# Patient Record
Sex: FEMALE | Race: WHITE | NOT HISPANIC OR LATINO | Employment: OTHER | URBAN - METROPOLITAN AREA
[De-identification: names, ages, dates, MRNs, and addresses within clinical notes are randomized per-mention and may not be internally consistent; named-entity substitution may affect disease eponyms.]

---

## 2017-01-13 ENCOUNTER — CLINICAL SUPPORT (OUTPATIENT)
Dept: FAMILY MEDICINE | Facility: CLINIC | Age: 77
End: 2017-01-13
Payer: MEDICARE

## 2017-01-13 ENCOUNTER — TELEPHONE (OUTPATIENT)
Dept: FAMILY MEDICINE | Facility: CLINIC | Age: 77
End: 2017-01-13

## 2017-01-13 DIAGNOSIS — N39.0 URINARY TRACT INFECTION WITH HEMATURIA, SITE UNSPECIFIED: Primary | ICD-10-CM

## 2017-01-13 DIAGNOSIS — R31.9 URINARY TRACT INFECTION WITH HEMATURIA, SITE UNSPECIFIED: Primary | ICD-10-CM

## 2017-01-13 LAB
BILIRUB SERPL-MCNC: ABNORMAL MG/DL
BLOOD URINE, POC: ABNORMAL
COLOR, POC UA: ABNORMAL
GLUCOSE UR QL STRIP: ABNORMAL
KETONES UR QL STRIP: ABNORMAL
LEUKOCYTE ESTERASE URINE, POC: ABNORMAL
NITRITE, POC UA: ABNORMAL
PH, POC UA: 6
PROTEIN, POC: ABNORMAL
SPECIFIC GRAVITY, POC UA: 1.01
UROBILINOGEN, POC UA: ABNORMAL

## 2017-01-13 PROCEDURE — 81002 URINALYSIS NONAUTO W/O SCOPE: CPT | Mod: S$GLB,,, | Performed by: FAMILY MEDICINE

## 2017-01-13 RX ORDER — CIPROFLOXACIN 250 MG/1
250 TABLET, FILM COATED ORAL 2 TIMES DAILY
Qty: 10 TABLET | Refills: 0 | Status: SHIPPED | OUTPATIENT
Start: 2017-01-13 | End: 2017-02-13 | Stop reason: ALTCHOICE

## 2017-01-26 ENCOUNTER — CLINICAL SUPPORT (OUTPATIENT)
Dept: FAMILY MEDICINE | Facility: CLINIC | Age: 77
End: 2017-01-26
Payer: MEDICARE

## 2017-01-26 ENCOUNTER — TELEPHONE (OUTPATIENT)
Dept: FAMILY MEDICINE | Facility: CLINIC | Age: 77
End: 2017-01-26

## 2017-01-26 DIAGNOSIS — R31.9 URINARY TRACT INFECTION WITH HEMATURIA, SITE UNSPECIFIED: Primary | ICD-10-CM

## 2017-01-26 DIAGNOSIS — N39.0 URINARY TRACT INFECTION WITHOUT HEMATURIA, SITE UNSPECIFIED: Primary | ICD-10-CM

## 2017-01-26 DIAGNOSIS — N39.0 URINARY TRACT INFECTION WITH HEMATURIA, SITE UNSPECIFIED: Primary | ICD-10-CM

## 2017-01-26 DIAGNOSIS — N39.0 URINARY TRACT INFECTION WITHOUT HEMATURIA, SITE UNSPECIFIED: ICD-10-CM

## 2017-01-26 LAB
BILIRUB SERPL-MCNC: ABNORMAL MG/DL
BLOOD URINE, POC: ABNORMAL
COLOR, POC UA: YELLOW
GLUCOSE UR QL STRIP: ABNORMAL
KETONES UR QL STRIP: ABNORMAL
LEUKOCYTE ESTERASE URINE, POC: ABNORMAL
NITRITE, POC UA: POSITIVE
PH, POC UA: 5
PROTEIN, POC: ABNORMAL
SPECIFIC GRAVITY, POC UA: 1.01
UROBILINOGEN, POC UA: 1

## 2017-01-26 PROCEDURE — 81002 URINALYSIS NONAUTO W/O SCOPE: CPT | Mod: S$GLB,,, | Performed by: FAMILY MEDICINE

## 2017-01-26 PROCEDURE — 87077 CULTURE AEROBIC IDENTIFY: CPT

## 2017-01-26 PROCEDURE — 87086 URINE CULTURE/COLONY COUNT: CPT

## 2017-01-26 PROCEDURE — 87186 SC STD MICRODIL/AGAR DIL: CPT

## 2017-01-26 PROCEDURE — 87088 URINE BACTERIA CULTURE: CPT

## 2017-01-26 RX ORDER — CEPHALEXIN 500 MG/1
1000 CAPSULE ORAL EVERY 12 HOURS
Qty: 28 CAPSULE | Refills: 0 | Status: SHIPPED | OUTPATIENT
Start: 2017-01-26 | End: 2017-02-13 | Stop reason: ALTCHOICE

## 2017-01-26 NOTE — TELEPHONE ENCOUNTER
Pt daughter dropped off urine sample  Urine is cloudy with large leukocytes, positive nitrates, trace bilirubin and trace blood  Please advise  See message below  Treat? Culture?

## 2017-01-26 NOTE — TELEPHONE ENCOUNTER
----- Message from Laine King sent at 1/26/2017  9:11 AM CST -----  Contact: Pt [phone]  Patient states she had a bladder infection recently but believes it may have started again. Patient states she started taking AZO her last dose was yesterday. Patient states urine color is back to normal now and would like to bring in a urine sample to check. Patient is experiencing frequent urination. Patient states she has no known allergies and [facility]

## 2017-01-28 LAB — BACTERIA UR CULT: NORMAL

## 2017-01-30 ENCOUNTER — TELEPHONE (OUTPATIENT)
Dept: FAMILY MEDICINE | Facility: CLINIC | Age: 77
End: 2017-01-30

## 2017-02-06 DIAGNOSIS — E78.00 HYPERCHOLESTEROLEMIA: ICD-10-CM

## 2017-02-06 RX ORDER — PRAVASTATIN SODIUM 80 MG/1
TABLET ORAL
Qty: 90 TABLET | Refills: 1 | Status: SHIPPED | OUTPATIENT
Start: 2017-02-06 | End: 2017-08-07 | Stop reason: SDUPTHER

## 2017-02-09 RX ORDER — ETODOLAC 400 MG/1
TABLET, FILM COATED ORAL
Qty: 180 TABLET | Refills: 0 | Status: SHIPPED | OUTPATIENT
Start: 2017-02-09 | End: 2017-03-31

## 2017-02-13 ENCOUNTER — OFFICE VISIT (OUTPATIENT)
Dept: FAMILY MEDICINE | Facility: CLINIC | Age: 77
End: 2017-02-13
Payer: MEDICARE

## 2017-02-13 VITALS
TEMPERATURE: 98 F | SYSTOLIC BLOOD PRESSURE: 144 MMHG | BODY MASS INDEX: 33.73 KG/M2 | HEIGHT: 66 IN | HEART RATE: 67 BPM | WEIGHT: 209.88 LBS | OXYGEN SATURATION: 94 % | DIASTOLIC BLOOD PRESSURE: 72 MMHG

## 2017-02-13 DIAGNOSIS — R60.0 EDEMA OF LEFT LOWER EXTREMITY: Primary | ICD-10-CM

## 2017-02-13 PROCEDURE — 99213 OFFICE O/P EST LOW 20 MIN: CPT | Mod: S$GLB,,, | Performed by: NURSE PRACTITIONER

## 2017-02-13 RX ORDER — PANTOPRAZOLE SODIUM 40 MG/1
1 TABLET, DELAYED RELEASE ORAL DAILY
COMMUNITY
Start: 2017-02-08 | End: 2017-03-31

## 2017-02-13 RX ORDER — ALBUTEROL SULFATE 90 UG/1
AEROSOL, METERED RESPIRATORY (INHALATION)
COMMUNITY
Start: 2017-02-01 | End: 2017-03-31

## 2017-02-13 NOTE — PROGRESS NOTES
Subjective:       Patient ID: Nallely Doty is a 77 y.o. female.    Chief Complaint: Rash (left leg) and Leg Swelling (left leg swelling)    Edema   This is a new problem. The current episode started in the past 7 days (left lower leg). The problem occurs constantly. The problem has been unchanged. Associated symptoms include a rash. Pertinent negatives include no abdominal pain, anorexia, arthralgias, change in bowel habit, chest pain, chills, congestion, coughing, diaphoresis, fatigue, fever, headaches, joint swelling, myalgias, nausea, neck pain, numbness, sore throat, swollen glands, urinary symptoms, vertigo, visual change, vomiting or weakness. Nothing aggravates the symptoms. Treatments tried: she is on bumex and hctz. The treatment provided no relief.     Review of Systems   Constitutional: Negative for chills, diaphoresis, fatigue and fever.   HENT: Negative for congestion and sore throat.    Respiratory: Negative for cough, chest tightness, shortness of breath and wheezing.    Cardiovascular: Positive for leg swelling. Negative for chest pain.   Gastrointestinal: Negative for abdominal pain, anorexia, change in bowel habit, nausea and vomiting.   Musculoskeletal: Negative for arthralgias, joint swelling, myalgias and neck pain.        Left lower leg edema     Skin: Positive for rash.        Has been to derm for her rash-says the MD diagnosed her with dry skin-rash is healing    Neurological: Negative for dizziness, vertigo, weakness, numbness and headaches.       Objective:      Physical Exam   Constitutional: She is oriented to person, place, and time. She appears well-developed and well-nourished. No distress.   HENT:   Right Ear: External ear normal.   Left Ear: External ear normal.   Cardiovascular: Normal rate, regular rhythm and normal heart sounds.    Pulses:       Dorsalis pedis pulses are 2+ on the left side.   Pulmonary/Chest: Effort normal and breath sounds normal.   Musculoskeletal: She  exhibits edema. She exhibits no tenderness or deformity.        Left lower leg: She exhibits swelling.        Left foot: There is swelling.   No pain with flexion or doriflexion, no warmth or redness to lower leg    Feet:   Left Foot:   Skin Integrity: Positive for ulcer (has an ulcer under left great toe being treated by podiatary).   Neurological: She is alert and oriented to person, place, and time.   Skin: Skin is warm and dry. She is not diaphoretic.   Psychiatric: She has a normal mood and affect. Her behavior is normal. Judgment and thought content normal.   Vitals reviewed.      Assessment:       1. Edema of left lower extremity        Plan:       Edema of left lower extremity    Continue dieretic  Elevate legs  Compression stocking  If leg becomes hot red or swollen go to the ED

## 2017-02-13 NOTE — MR AVS SNAPSHOT
[facility]  [address] [place] [address]  Phone: [phone]  Fax: [phone]                  Nallely Doty   2017 9:20 AM   Office Visit    Description:  Female : 1940   Provider:  Johanna Eden NP   Department:  [facility]           Reason for Visit     Rash     Leg Swelling           Diagnoses this Visit        Comments    Edema of left lower extremity    -  Primary            To Do List           Goals (5 Years of Data)     None      [facility]     [facility] On [facility] Care [facility] -  Assistance  Registered nurses in the [facility] On Call Center provide clinical advisement, health education, appointment booking, and other advisory services.  Call for this free service at [phone].             Medications           Message regarding Medications     Verify the changes and/or additions to your medication regime listed below are the same as discussed with your clinician today.  If any of these changes or additions are incorrect, please notify your healthcare provider.        STOP taking these medications     cephALEXin (KEFLEX) 500 MG capsule Take 2 capsules (1,000 mg total) by mouth every 12 (twelve) hours.    ciprofloxacin HCl (CIPRO) 250 MG tablet Take 1 tablet (250 mg total) by mouth 2 (two) times daily.    sulfamethoxazole-trimethoprim 800-160mg (BACTRIM DS) 800-160 mg Tab Take 1 tablet by mouth 2 (two) times daily.           Verify that the below list of medications is an accurate representation of the medications you are currently taking.  If none reported, the list may be blank. If incorrect, please contact your healthcare provider. Carry this list with you in case of emergency.           Current Medications     albuterol-ipratropium 2.5mg-0.5mg/3mL (DUO-NEB) 0.5 mg-3 mg(2.5 mg base)/3 mL nebulizer solution Take 3 mLs by nebulization every 6 (six) hours as needed for Wheezing.    aspirin (ECOTRIN) 81 MG EC tablet Take 81 mg by  "mouth once daily.    bromfenac (XIBROM) 0.09 % ophthalmic solution     bumetanide (BUMEX) 1 MG tablet TAKE 1 TABLET DAILY    carbamazepine (TEGRETOL) 200 mg tablet TAKE 1 TABLET TWICE A DAY    carvedilol (COREG) 25 MG tablet     cinacalcet (SENSIPAR) 60 MG Tab Take 60 mg by mouth daily with breakfast.    etodolac (LODINE) 400 MG tablet TAKE 1 TABLET TWICE A DAY    ferrous sulfate 325 mg (65 mg iron) Tab tablet Take 325 mg by mouth 2 (two) times daily.    gemfibrozil (LOPID) 600 MG tablet TAKE 1 TABLET TWICE A DAY    gentamicin (GARAMYCIN) 0.3 % ophthalmic solution 1 drop affected eye times a day for 5 days    hydrochlorothiazide (HYDRODIURIL) 50 MG tablet Take 1 tablet (50 mg total) by mouth 3 (three) times a week. Monday, wed, fri morning    levothyroxine (SYNTHROID) 100 MCG tablet TAKE 1 TABLET BEFORE BREAKFAST    pantoprazole (PROTONIX) 40 MG tablet Take 1 tablet by mouth once daily.    pravastatin (PRAVACHOL) 80 MG tablet TAKE 1 TABLET DAILY    SYMBICORT 80-4.5 mcg/actuation HFAA     torsemide (DEMADEX) 100 MG Tab Take 1 tablet (100 mg total) by mouth once daily.    triamcinolone acetonide 0.1% (KENALOG) 0.1 % cream     walker (LILI ROLLING WALKER) Misc Use for ambulation    VENTOLIN HFA 90 mcg/actuation inhaler            Clinical Reference Information           Your Vitals Were     BP Pulse Temp Height Weight SpO2    144/72 67 98.3 °F (36.8 °C) (Oral) 5' 6" (1.676 m) 95.2 kg (209 lb 14.1 oz) 94%    BMI                33.88 kg/m2          Blood Pressure          Most Recent Value    BP  (!)  144/72      Allergies as of 2/13/2017     Iodine And Iodide Containing Products    Shellfish Containing Products      Immunizations Administered on Date of Encounter - 2/13/2017     None      [facility] Sign-Up     Activating your [facility] account is as easy as 1-2-3!     1) Visit [email or web address], select Sign Up Now, enter this activation code and your date of birth, then select Next.  [email or web address]  Expires: 3/30/2017 " 10:45 AM      2) Create a username and password to use when you visit [facility] in the future and select a security question in case you lose your password and select Next.    3) Enter your e-mail address and click Sign Up!    Additional Information  If you have questions, please e-mail [email or web address] or call [phone] to talk to our [employer] staff. Remember, [facility] is NOT to be used for urgent needs. For medical emergencies, dial 911.         Language Assistance Services     ATTENTION: Language assistance services are available, free of charge. Please call [phone].      ATENCIÓN: Si trevorla maría, tiene a berumen disposición servicios gratuitos de asistencia lingüística. Llame al [phone].     CHÚ Ý: N?u b?n nói Ti?ng Vi?t, có các d?ch v? h? tr? ngôn ng? mi?n phí dành cho b?n. G?i s? [phone].         [facility] complies with applicable Federal civil rights laws and does not discriminate on the basis of race, color, national origin, age, disability, or sex.

## 2017-02-15 ENCOUNTER — OFFICE VISIT (OUTPATIENT)
Dept: FAMILY MEDICINE | Facility: CLINIC | Age: 77
End: 2017-02-15
Payer: MEDICARE

## 2017-02-15 VITALS
WEIGHT: 210.56 LBS | HEIGHT: 66 IN | HEART RATE: 71 BPM | SYSTOLIC BLOOD PRESSURE: 136 MMHG | TEMPERATURE: 98 F | BODY MASS INDEX: 33.84 KG/M2 | DIASTOLIC BLOOD PRESSURE: 80 MMHG | OXYGEN SATURATION: 93 %

## 2017-02-15 DIAGNOSIS — L03.116 CELLULITIS OF LEFT LOWER EXTREMITY: Primary | ICD-10-CM

## 2017-02-15 PROCEDURE — 99213 OFFICE O/P EST LOW 20 MIN: CPT | Mod: S$GLB,,, | Performed by: NURSE PRACTITIONER

## 2017-02-15 RX ORDER — SULFAMETHOXAZOLE AND TRIMETHOPRIM 800; 160 MG/1; MG/1
1 TABLET ORAL 2 TIMES DAILY
Qty: 14 TABLET | Refills: 0 | Status: SHIPPED | OUTPATIENT
Start: 2017-02-15 | End: 2017-02-25

## 2017-02-15 NOTE — PATIENT INSTRUCTIONS
Cellulitis  Cellulitis is an infection of the deep layers of skin. A break in the skin, such as a cut or scratch, can let bacteria under the skin. If the bacteria get to deep layers of the skin, it can be serious. If not treated, cellulitis can get into the bloodstream and lymph nodes. The infection can then spread throughout the body. This causes serious illness.  Cellulitis causes the affected skin to become red, swollen, warm, and sore. The reddened areas have a visible border. An open sore may leak fluid (pus). You may have a fever, chills, and pain.  Cellulitis is treated with antibiotics taken for 7 to 10 days. An open sore may be cleaned and covered with cool wet gauze. Symptoms should get better 1 to 2 days after treatment is started. Make sure to take all the antibiotics for the full number of days until they are gone. Keep taking the medicine even if your symptoms go away.  Home care  Follow these tips:  · Limit the use of the part of your body with cellulitis.   · If the infection is on your leg, keep your leg raised while sitting. This will help to reduce swelling.  · Take all of the antibiotic medicine exactly as directed until it is gone. Do not miss any doses, especially during the first 7 days. Dont stop taking the medicine when your symptoms get better.  · Keep the affected area clean and dry.  · Wash your hands with soap and warm water before and after touching your skin. Anyone else who touches your skin should also wash his or her hands. Don't share towels.  Follow-up care  Follow up with your healthcare provider, or as advised. If your infection does not go away on the first antibiotic, your healthcare provider will prescribe a different one.  When to seek medical advice  Call your healthcare provider right away if any of these occur:  · Red areas that spread  · Swelling or pain that gets worse  · Fluid leaking from the skin (pus)  · Fever higher of 100.4º F (38.0º C) or higher after 2 days  on antibiotics  Date Last Reviewed: 9/1/2016  © 8127-0235 [employer]. [address] [place] [address], [place] [address]. All rights reserved. This information is not intended as a substitute for professional medical care. Always follow your healthcare professional's instructions.

## 2017-02-15 NOTE — MR AVS SNAPSHOT
[place] [address] [address]  Phone: [phone]  Fax: [phone]                  Nallely Doty   2/15/2017 11:40 AM   Office Visit    Description:  Female : 1940   Provider:  Johanna Eden NP   Department:  [facility]           Reason for Visit     Edema           Diagnoses this Visit        Comments    Cellulitis of left lower extremity    -  Primary            To Do List           Goals (5 Years of Data)     None       These Medications        Disp Refills Start End    sulfamethoxazole-trimethoprim 800-160mg (BACTRIM DS) 800-160 mg Tab 14 tablet 0 2/15/2017 2017    Take 1 tablet by mouth 2 (two) times daily. - Oral    Pharmacy: [facility] #: [phone]         [facility] On Call     [facility] On Call Nurse Care Line -  Assistance  Registered nurses in the [facility] On Call Center provide clinical advisement, health education, appointment booking, and other advisory services.  Call for this free service at [phone].             Medications           Message regarding Medications     Verify the changes and/or additions to your medication regime listed below are the same as discussed with your clinician today.  If any of these changes or additions are incorrect, please notify your healthcare provider.        START taking these NEW medications        Refills    sulfamethoxazole-trimethoprim 800-160mg (BACTRIM DS) 800-160 mg Tab 0    Sig: Take 1 tablet by mouth 2 (two) times daily.    Class: Normal    Route: Oral           Verify that the below list of medications is an accurate representation of the medications you are currently taking.  If none reported, the list may be blank. If incorrect, please contact your healthcare provider. Carry this list with you in case of emergency.           Current Medications     albuterol-ipratropium 2.5mg-0.5mg/3mL (DUO-NEB) 0.5 mg-3  "mg(2.5 mg base)/3 mL nebulizer solution Take 3 mLs by nebulization every 6 (six) hours as needed for Wheezing.    aspirin (ECOTRIN) 81 MG EC tablet Take 81 mg by mouth once daily.    bromfenac (XIBROM) 0.09 % ophthalmic solution     bumetanide (BUMEX) 1 MG tablet TAKE 1 TABLET DAILY    carbamazepine (TEGRETOL) 200 mg tablet TAKE 1 TABLET TWICE A DAY    carvedilol (COREG) 25 MG tablet     cinacalcet (SENSIPAR) 60 MG Tab Take 60 mg by mouth daily with breakfast.    etodolac (LODINE) 400 MG tablet TAKE 1 TABLET TWICE A DAY    ferrous sulfate 325 mg (65 mg iron) Tab tablet Take 325 mg by mouth 2 (two) times daily.    gemfibrozil (LOPID) 600 MG tablet TAKE 1 TABLET TWICE A DAY    gentamicin (GARAMYCIN) 0.3 % ophthalmic solution 1 drop affected eye times a day for 5 days    hydrochlorothiazide (HYDRODIURIL) 50 MG tablet Take 1 tablet (50 mg total) by mouth 3 (three) times a week. Monday, wed, fri morning    levothyroxine (SYNTHROID) 100 MCG tablet TAKE 1 TABLET BEFORE BREAKFAST    pantoprazole (PROTONIX) 40 MG tablet Take 1 tablet by mouth once daily.    pravastatin (PRAVACHOL) 80 MG tablet TAKE 1 TABLET DAILY    SYMBICORT 80-4.5 mcg/actuation HFAA     torsemide (DEMADEX) 100 MG Tab Take 1 tablet (100 mg total) by mouth once daily.    triamcinolone acetonide 0.1% (KENALOG) 0.1 % cream     VENTOLIN HFA 90 mcg/actuation inhaler     walker (LILI ROLLING WALKER) Misc Use for ambulation    sulfamethoxazole-trimethoprim 800-160mg (BACTRIM DS) 800-160 mg Tab Take 1 tablet by mouth 2 (two) times daily.           Clinical Reference Information           Your Vitals Were     BP Pulse Temp Height Weight SpO2    136/80 71 97.5 °F (36.4 °C) (Oral) 5' 6" (1.676 m) 95.5 kg (210 lb 8.6 oz) 93%    BMI                33.98 kg/m2          Blood Pressure          Most Recent Value    BP  136/80      Allergies as of 2/15/2017     Iodine And Iodide Containing Products    Shellfish Containing Products      Immunizations Administered on Date of " Encounter - 2/15/2017     None      [facility] Sign-Up     Activating your [facility] account is as easy as 1-2-3!     1) Visit [email or web address], select Sign Up Now, enter this activation code and your date of birth, then select Next.  [email or web address]  Expires: 3/30/2017 10:45 AM      2) Create a username and password to use when you visit [facility] in the future and select a security question in case you lose your password and select Next.    3) Enter your e-mail address and click Sign Up!    Additional Information  If you have questions, please e-mail [email or web address] or call [phone] to talk to our [facility] staff. Remember, [facility] is NOT to be used for urgent needs. For medical emergencies, dial 911.         Instructions      Cellulitis  Cellulitis is an infection of the deep layers of skin. A break in the skin, such as a cut or scratch, can let bacteria under the skin. If the bacteria get to deep layers of the skin, it can be serious. If not treated, cellulitis can get into the bloodstream and lymph nodes. The infection can then spread throughout the body. This causes serious illness.  Cellulitis causes the affected skin to become red, swollen, warm, and sore. The reddened areas have a visible border. An open sore may leak fluid (pus). You may have a fever, chills, and pain.  Cellulitis is treated with antibiotics taken for 7 to 10 days. An open sore may be cleaned and covered with cool wet gauze. Symptoms should get better 1 to 2 days after treatment is started. Make sure to take all the antibiotics for the full number of days until they are gone. Keep taking the medicine even if your symptoms go away.  Home care  Follow these tips:  · Limit the use of the part of your body with cellulitis.   · If the infection is on your leg, keep your leg raised while sitting. This will help to reduce swelling.  · Take all of the antibiotic medicine exactly as directed until it is gone. Do not miss any doses,  especially during the first 7 days. Dont stop taking the medicine when your symptoms get better.  · Keep the affected area clean and dry.  · Wash your hands with soap and warm water before and after touching your skin. Anyone else who touches your skin should also wash his or her hands. Don't share towels.  Follow-up care  Follow up with your healthcare provider, or as advised. If your infection does not go away on the first antibiotic, your healthcare provider will prescribe a different one.  When to seek medical advice  Call your healthcare provider right away if any of these occur:  · Red areas that spread  · Swelling or pain that gets worse  · Fluid leaking from the skin (pus)  · Fever higher of 100.4º F (38.0º C) or higher after 2 days on antibiotics  Date Last Reviewed: 9/1/2016  © 2785-6259 [employer]. [address] [place] [address]. All rights reserved. This information is not intended as a substitute for professional medical care. Always follow your healthcare professional's instructions.             Language Assistance Services     ATTENTION: Language assistance services are available, free of charge. Please call [phone].      ATENCIÓN: Si abimbola daniel, tiene a berumen disposición servicios gratuitos de asistencia lingüística. Llame al [phone].     DIYA Ý: N?u b?n nói Ti?ng Vi?t, có các d?ch v? h? tr? ngôn ng? mi?n phí dành cho b?n. G?i s? [phone].         [facility] complies with applicable Federal civil rights laws and does not discriminate on the basis of race, color, national origin, age, disability, or sex.

## 2017-02-17 DIAGNOSIS — E03.2 HYPOTHYROIDISM DUE TO NON-MEDICATION EXOGENOUS SUBSTANCES: ICD-10-CM

## 2017-02-17 RX ORDER — LEVOTHYROXINE SODIUM 100 UG/1
TABLET ORAL
Qty: 90 TABLET | Refills: 1 | Status: SHIPPED | OUTPATIENT
Start: 2017-02-17 | End: 2017-08-16 | Stop reason: SDUPTHER

## 2017-03-31 ENCOUNTER — OFFICE VISIT (OUTPATIENT)
Dept: FAMILY MEDICINE | Facility: CLINIC | Age: 77
End: 2017-03-31
Payer: MEDICARE

## 2017-03-31 VITALS
TEMPERATURE: 98 F | DIASTOLIC BLOOD PRESSURE: 76 MMHG | OXYGEN SATURATION: 97 % | SYSTOLIC BLOOD PRESSURE: 128 MMHG | HEIGHT: 66 IN | WEIGHT: 204.13 LBS | HEART RATE: 60 BPM | BODY MASS INDEX: 32.81 KG/M2

## 2017-03-31 DIAGNOSIS — N18.9 CHRONIC KIDNEY DISEASE, UNSPECIFIED STAGE: ICD-10-CM

## 2017-03-31 DIAGNOSIS — I95.9 HYPOTENSION, UNSPECIFIED HYPOTENSION TYPE: Primary | ICD-10-CM

## 2017-03-31 DIAGNOSIS — J98.4 PULMONARY DISEASE: ICD-10-CM

## 2017-03-31 PROCEDURE — 99213 OFFICE O/P EST LOW 20 MIN: CPT | Mod: S$GLB,,, | Performed by: FAMILY MEDICINE

## 2017-03-31 RX ORDER — FAMOTIDINE 20 MG/1
20 TABLET, FILM COATED ORAL 2 TIMES DAILY PRN
COMMUNITY
Start: 2017-02-16 | End: 2017-03-31

## 2017-03-31 NOTE — PATIENT INSTRUCTIONS
Reduce Torsemide to 50 mg daily if it is not enough fluid control increase to a whole pill MWF    IF low blood pressure continues to be low or you are dizzy  Coreg 25 mg cut in half and take half two times a day

## 2017-03-31 NOTE — PROGRESS NOTES
Patient ID: Nallely Doty is a 77 y.o. female.    Chief Complaint: Follow-up (check-up); Dizziness (lightheaded); and Shortness of Breath    HPI       Nallely Doty is a 77 y.o. female with weakness in the am for 3 hours after taking meds.  Never held meds.   No fever chills nausea vomiting diarrhea  Appetite is the same  descrepacy about use of diuretics-recently taken off HCTZ     Blood pressure readings over the last several weeks have been decreasing now in the mid to lower teens systolic    Review of Symptoms    Constitutional  Neg activity change, No chills/ fever   Resp  Neg hemoptysis, stridor, choking  CVS  Neg chest pain, palpitations    Physical Exam    Constitutional:   Oriented to person, place, and time.appears well-developed and well-nourished.   No distress.     HENT:   Head: Normocephalic and atraumatic.       Eyes:   Conjunctivae are normal. Right eye exhibits no discharge. Left eye exhibits no discharge. No scleral icterus. No periorbital edema       Cardiovascular:  Regular rate, Regular rhythm     No extrasystole  Normal S1-S2    Pulmonary/Chest:   Normal effort and breath sounds.  No wheezing, rhonchi or rales.      Musculoskeletal:  1+ edema lower extremity   No obvious deformity No waisting     Neurological:   Alert and oriented to person, place, and time. Coordination normal. -uses walker for ambulation    Skin:   Skin is warm and dry.   No diaphoresis.   No rash noted.    Psychiatric: Normal mood and affect. Behavior is normal. Judgment and thought content normal.       Assessment / Plan:      ICD-10-CM ICD-9-CM   1. Hypotension, unspecified hypotension type I95.9 458.9   2. Pulmonary disease J98.4 518.89   3. Chronic kidney disease, unspecified stage N18.9 585.9     Hypotension, unspecified hypotension type  Comments:  decrease diuretics    Pulmonary disease  Comments:  Stable at this time    Chronic kidney disease, unspecified stage  Comments:  Keep diuretics at a  minimal    Continue follow-up with nephrologist

## 2017-03-31 NOTE — MR AVS SNAPSHOT
[facility]  [address] [place] [address]  Phone: [phone]  Fax: [phone]                  Nallely Doty   3/31/2017 10:40 AM   Office Visit    Description:  Female : 1940   Provider:  Gabriel Vidse MD   Department:  [facility]           Reason for Visit     Follow-up     Dizziness     Shortness of Breath                To Do List           Goals (5 Years of Data)     None      [facility]     [facility] On [facility] Nurse Care Line -  Assistance  Unless otherwise directed by your provider, please contact [facility], our nurse care line that is available for  assistance.     Registered nurses in the [facility] provide: appointment scheduling, clinical advisement, health education, and other advisory services.  Call: [phone] (toll free)               Medications           Message regarding Medications     Verify the changes and/or additions to your medication regime listed below are the same as discussed with your clinician today.  If any of these changes or additions are incorrect, please notify your healthcare provider.        STOP taking these medications     hydrochlorothiazide (HYDRODIURIL) 50 MG tablet Take 1 tablet (50 mg total) by mouth 3 (three) times a week. Monday, wed, fri morning    etodolac (LODINE) 400 MG tablet TAKE 1 TABLET TWICE A DAY    famotidine (PEPCID) 20 MG tablet Take 20 mg by mouth 2 (two) times daily as needed.     gentamicin (GARAMYCIN) 0.3 % ophthalmic solution 1 drop affected eye times a day for 5 days    pantoprazole (PROTONIX) 40 MG tablet Take 1 tablet by mouth once daily.    VENTOLIN HFA 90 mcg/actuation inhaler     bumetanide (BUMEX) 1 MG tablet TAKE 1 TABLET DAILY           Verify that the below list of medications is an accurate representation of the medications you are currently taking.  If none reported, the list may be blank. If incorrect, please contact your healthcare  "provider. Carry this list with you in case of emergency.           Current Medications     albuterol-ipratropium 2.5mg-0.5mg/3mL (DUO-NEB) 0.5 mg-3 mg(2.5 mg base)/3 mL nebulizer solution Take 3 mLs by nebulization every 6 (six) hours as needed for Wheezing.    aspirin (ECOTRIN) 81 MG EC tablet Take 81 mg by mouth once daily.    bromfenac (XIBROM) 0.09 % ophthalmic solution Place 1 drop into both eyes 2 (two) times daily.     carbamazepine (TEGRETOL) 200 mg tablet TAKE 1 TABLET TWICE A DAY    carvedilol (COREG) 25 MG tablet Take 25 mg by mouth 2 (two) times daily with meals.     cinacalcet (SENSIPAR) 60 MG Tab Take 60 mg by mouth daily with breakfast.    ferrous sulfate 325 mg (65 mg iron) Tab tablet Take 325 mg by mouth 2 (two) times daily.    gemfibrozil (LOPID) 600 MG tablet TAKE 1 TABLET TWICE A DAY    levothyroxine (SYNTHROID) 100 MCG tablet TAKE 1 TABLET BEFORE BREAKFAST    pravastatin (PRAVACHOL) 80 MG tablet TAKE 1 TABLET DAILY    SYMBICORT 80-4.5 mcg/actuation HFAA Inhale 2 puffs into the lungs once daily.     torsemide (DEMADEX) 100 MG Tab Take 1 tablet (100 mg total) by mouth once daily.    triamcinolone acetonide 0.1% (KENALOG) 0.1 % cream     walker (LILI ROLLING WALKER) Misc Use for ambulation           Clinical Reference Information           Your Vitals Were     BP Pulse Temp Height Weight SpO2    128/76 60 97.8 °F (36.6 °C) (Oral) 5' 6" (1.676 m) 92.6 kg (204 lb 2.3 oz) 97%    BMI                32.95 kg/m2          Blood Pressure          Most Recent Value    BP  128/76      Allergies as of 3/31/2017     Iodine And Iodide Containing Products    Shellfish Containing Products      Immunizations Administered on Date of Encounter - 3/31/2017     None      [facility] Sign-Up     Activating your [facility] account is as easy as 1-2-3!     1) Visit [email or web address], select Sign Up Now, enter this activation code and your date of birth, then select Next.  [email or web address]  Expires: 5/15/2017 11:58 AM      2) " Create a username and password to use when you visit [facility] in the future and select a security question in case you lose your password and select Next.    3) Enter your e-mail address and click Sign Up!    Additional Information  If you have questions, please e-mail [email or web address] or call [phone] to talk to our [facility] staff. Remember, [facility] is NOT to be used for urgent needs. For medical emergencies, dial 911.         Instructions    Reduce Torsemide to 50 mg daily if it is not enough fluid control increase to a whole pill MWF    IF low blood pressure continues to be low or you are dizzy  Coreg 25 mg cut in half and take half two times a day       Language Assistance Services     ATTENTION: Language assistance services are available, free of charge. Please call [phone].      ATENCIÓN: Si abimbola daniel, tiene a berumen disposición servicios gratuitos de asistencia lingüística. Llame al [phone].     CHÚ Ý: N?u b?n nói Ti?ng Vi?t, có các d?ch v? h? tr? ngôn ng? mi?n phí dành cho b?n. G?i s? [phone].         [facility] complies with applicable Federal civil rights laws and does not discriminate on the basis of race, color, national origin, age, disability, or sex.

## 2017-04-21 ENCOUNTER — OFFICE VISIT (OUTPATIENT)
Dept: FAMILY MEDICINE | Facility: CLINIC | Age: 77
End: 2017-04-21
Payer: MEDICARE

## 2017-04-21 VITALS
OXYGEN SATURATION: 97 % | TEMPERATURE: 97 F | HEART RATE: 77 BPM | SYSTOLIC BLOOD PRESSURE: 134 MMHG | DIASTOLIC BLOOD PRESSURE: 82 MMHG | HEIGHT: 66 IN | WEIGHT: 207.25 LBS | BODY MASS INDEX: 33.31 KG/M2

## 2017-04-21 DIAGNOSIS — L03.116 CELLULITIS OF LEFT LOWER EXTREMITY: Primary | ICD-10-CM

## 2017-04-21 DIAGNOSIS — N39.0 URINARY TRACT INFECTION WITH HEMATURIA, SITE UNSPECIFIED: ICD-10-CM

## 2017-04-21 DIAGNOSIS — R31.9 URINARY TRACT INFECTION WITH HEMATURIA, SITE UNSPECIFIED: ICD-10-CM

## 2017-04-21 LAB
BILIRUB SERPL-MCNC: NEGATIVE MG/DL
BLOOD URINE, POC: NEGATIVE
COLOR, POC UA: ABNORMAL
GLUCOSE UR QL STRIP: NORMAL
KETONES UR QL STRIP: NEGATIVE
LEUKOCYTE ESTERASE URINE, POC: POSITIVE
NITRITE, POC UA: NEGATIVE
PH, POC UA: 5
PROTEIN, POC: ABNORMAL
SPECIFIC GRAVITY, POC UA: 1.01
UROBILINOGEN, POC UA: NORMAL

## 2017-04-21 PROCEDURE — 81002 URINALYSIS NONAUTO W/O SCOPE: CPT | Mod: S$GLB,,, | Performed by: FAMILY MEDICINE

## 2017-04-21 PROCEDURE — 99213 OFFICE O/P EST LOW 20 MIN: CPT | Mod: 25,S$GLB,, | Performed by: FAMILY MEDICINE

## 2017-04-21 RX ORDER — DOXYCYCLINE HYCLATE 100 MG
1 TABLET ORAL 2 TIMES DAILY
COMMUNITY
Start: 2017-04-18 | End: 2017-05-05 | Stop reason: ALTCHOICE

## 2017-04-21 RX ORDER — CLINDAMYCIN HYDROCHLORIDE 300 MG/1
300 CAPSULE ORAL 4 TIMES DAILY
Qty: 40 CAPSULE | Refills: 0 | Status: SHIPPED | OUTPATIENT
Start: 2017-04-21 | End: 2017-05-01

## 2017-04-22 NOTE — PROGRESS NOTES
Patient ID: Nallely Doty is a 77 y.o. female.    Chief Complaint: Rash (left leg)    HPI      Nallely Doty is a 77 y.o. female with left leg erythema that is worsening over the last several days.  She's had no fever chills.  However experiencing some tenderness along the erythematous areas.  Recent history of being treated for cellulitis of the left inner thigh resolved mostly but not completely.  Complains of mild dysuria slight increased frequency.            Review of Symptoms    Constitutional  Neg activity change, No chills /fever   Resp  Neg hemoptysis, stridor, choking  CVS  Neg chest pain, palpitations    Physical Exam    Constitutional:  Oriented to person, place, and time.appears well-developed and well-nourished.  No distress.      HENT  Head: Normocephalic and atraumatic  Right Ear: External ear normal.   Left Ear: External ear normal.   Nose: External nose normal.   Mouth:  Moist mucus membranes.    Eyes:  Conjunctivae are normal. Right eye exhibits no discharge.  Left eye exhibits no discharge. No scleral icterus.  No periorbital edema    Cardiovascular:  Regular rate, Regular rhythm     No extrasystole  Normal S1-S2      Pulmonary/Chest:   Normal effort and breath sounds.  No wheezing, rhonchi or rales.    Musculoskeletal:  No edema. No obvious deformity No waisting       Neurological:  Alert and oriented to person, place, and time.   Large erythematous area covering the inner thigh approximately 20 cm x 12 cm Concepcion to touch mildly tender    Skin:   Skin is warm and dry.  No diaphoresis.   No rash noted.     Psychiatric: Normal mood and affect. Behavior is normal.  Judgment and thought content normal.       Assessment / Plan:      ICD-10-CM ICD-9-CM   1. Urinary tract infection with hematuria, site unspecified N39.0 599.0    R31.9      Urinary tract infection with hematuria, site unspecified  -     POCT URINE DIPSTICK WITHOUT MICROSCOPE    Other orders  -     clindamycin (CLEOCIN) 300  MG capsule; Take 1 capsule (300 mg total) by mouth 4 (four) times daily.  Dispense: 40 capsule; Refill: 0

## 2017-04-24 ENCOUNTER — TELEPHONE (OUTPATIENT)
Dept: FAMILY MEDICINE | Facility: CLINIC | Age: 77
End: 2017-04-24

## 2017-04-24 NOTE — TELEPHONE ENCOUNTER
----- Message from Laine King sent at 4/24/2017  8:31 AM CDT -----  Contact: Pt [phone]  Patient states she was advised by Dr.St. Villa on Friday to walk-in the office today to see him. Patient would like to know what is a good time. Please advise

## 2017-04-27 ENCOUNTER — TELEPHONE (OUTPATIENT)
Dept: FAMILY MEDICINE | Facility: CLINIC | Age: 77
End: 2017-04-27

## 2017-04-27 NOTE — TELEPHONE ENCOUNTER
----- Message from Laine King sent at 4/27/2017  9:29 AM CDT -----  Contact: Pt [phone]  FYI: Patient coming to the back door just to have Dr. Scott check out her leg. She states she was told to come today

## 2017-05-04 ENCOUNTER — TELEPHONE (OUTPATIENT)
Dept: FAMILY MEDICINE | Facility: CLINIC | Age: 77
End: 2017-05-04

## 2017-05-04 NOTE — TELEPHONE ENCOUNTER
----- Message from Laine King sent at 5/4/2017  2:33 PM CDT -----  Contact: Pt [phone]  Patient states Dr. Scott has been treating her for a rash. Patient states the rash has spread all over her stomach (It keeps on moving up and down her stomach and it's inflammed). Patient states she's out of the medication Dr. Scott prescribed for it. Please advise

## 2017-05-04 NOTE — TELEPHONE ENCOUNTER
Doubt cellulitis it has spread to your stomach you would probably be very sick  Out of town  Do we have room for b to see or md at [facility] or [facility]

## 2017-05-05 ENCOUNTER — OFFICE VISIT (OUTPATIENT)
Dept: FAMILY MEDICINE | Facility: CLINIC | Age: 77
End: 2017-05-05
Payer: MEDICARE

## 2017-05-05 VITALS
HEIGHT: 66 IN | TEMPERATURE: 97 F | HEART RATE: 74 BPM | OXYGEN SATURATION: 95 % | WEIGHT: 207.69 LBS | SYSTOLIC BLOOD PRESSURE: 128 MMHG | DIASTOLIC BLOOD PRESSURE: 72 MMHG | BODY MASS INDEX: 33.38 KG/M2

## 2017-05-05 DIAGNOSIS — L03.311 CELLULITIS OF ABDOMINAL WALL: Primary | ICD-10-CM

## 2017-05-05 PROCEDURE — 99213 OFFICE O/P EST LOW 20 MIN: CPT | Mod: S$GLB,,, | Performed by: FAMILY MEDICINE

## 2017-05-05 NOTE — PROGRESS NOTES
Subjective:      Patient ID: Nallely Doty is a 77 y.o. female.    Chief Complaint: Recurrent Skin Infections (cellulitis)    HPI Comments: Rash worse; up left leg to groin and abd apron; finished abx; painful; recent labs unremarkable    Review of Systems   Skin: Positive for rash.     Objective:     Physical Exam   Constitutional: She is oriented to person, place, and time. She appears well-developed and well-nourished.   Obese, here witih family, uses walker   HENT:   Head: Normocephalic.   Eyes: Conjunctivae and EOM are normal. Pupils are equal, round, and reactive to light.   Neck: Normal range of motion. Neck supple.   Cardiovascular: Normal rate, regular rhythm and normal heart sounds.    Pulmonary/Chest: Effort normal and breath sounds normal.   Musculoskeletal: Normal range of motion.   Neurological: She is alert and oriented to person, place, and time. She has normal reflexes.   Skin: Skin is warm and dry.   Bright red cellulitis left leg groin abd apron   Psychiatric: She has a normal mood and affect. Her behavior is normal. Judgment and thought content normal.   Nursing note and vitals reviewed.    Assessment:     1. Cellulitis of abdominal wall      Plan:     New Prescriptions    No medications on file     Discontinued Medications    DOXYCYCLINE (VIBRA-TABS) 100 MG TABLET    Take 1 tablet by mouth 2 (two) times daily.     Modified Medications    No medications on file       Cellulitis of abdominal wall    I recommned admit for IV ABX; she agrees; family will bring to [facility] ER here choice. I gave report to nurse in ER.

## 2017-05-05 NOTE — MR AVS SNAPSHOT
[facility]  [address] [place] [address]  Phone: [phone]  Fax: [phone]                  Nallely Doty   2017 9:00 AM   Office Visit    Description:  Female : 1940   Provider:  Monty Marsh MD   Department:  [facility]           Reason for Visit     Recurrent Skin Infections           Diagnoses this Visit        Comments    Cellulitis of abdominal wall    -  Primary            To Do List           Goals (5 Years of Data)     None      Follow-Up and Disposition     Return if symptoms worsen or fail to improve.      [facility] On Call     [facility] On Call Nurse Care Line -  Assistance  Unless otherwise directed by your provider, please contact [facility], our nurse care line that is available for  assistance.     Registered nurses in the [facility] On Call Center provide: appointment scheduling, clinical advisement, health education, and other advisory services.  Call: [phone] (toll free)               Medications           Message regarding Medications     Verify the changes and/or additions to your medication regime listed below are the same as discussed with your clinician today.  If any of these changes or additions are incorrect, please notify your healthcare provider.        STOP taking these medications     doxycycline (VIBRA-TABS) 100 MG tablet Take 1 tablet by mouth 2 (two) times daily.           Verify that the below list of medications is an accurate representation of the medications you are currently taking.  If none reported, the list may be blank. If incorrect, please contact your healthcare provider. Carry this list with you in case of emergency.           Current Medications     albuterol-ipratropium 2.5mg-0.5mg/3mL (DUO-NEB) 0.5 mg-3 mg(2.5 mg base)/3 mL nebulizer solution Take 3 mLs by nebulization every 6 (six) hours as needed for Wheezing.    aspirin (ECOTRIN) 81 MG EC tablet Take 81 mg by mouth once daily.     "bromfenac (XIBROM) 0.09 % ophthalmic solution Place 1 drop into both eyes 2 (two) times daily.     carbamazepine (TEGRETOL) 200 mg tablet TAKE 1 TABLET TWICE A DAY    carvedilol (COREG) 25 MG tablet Take 25 mg by mouth 2 (two) times daily with meals.     cinacalcet (SENSIPAR) 60 MG Tab Take 60 mg by mouth daily with breakfast.    ferrous sulfate 325 mg (65 mg iron) Tab tablet Take 325 mg by mouth 2 (two) times daily.    gemfibrozil (LOPID) 600 MG tablet TAKE 1 TABLET TWICE A DAY    levothyroxine (SYNTHROID) 100 MCG tablet TAKE 1 TABLET BEFORE BREAKFAST    pravastatin (PRAVACHOL) 80 MG tablet TAKE 1 TABLET DAILY    SYMBICORT 80-4.5 mcg/actuation HFAA Inhale 2 puffs into the lungs once daily.     triamcinolone acetonide 0.1% (KENALOG) 0.1 % cream Apply topically 2 (two) times daily.     walker (LILI ROLLING WALKER) Misc Use for ambulation           Clinical Reference Information           Your Vitals Were     BP Pulse Temp Height Weight SpO2    128/72 74 96.9 °F (36.1 °C) (Oral) 5' 6" (1.676 m) 94.2 kg (207 lb 10.8 oz) 95%    BMI                33.52 kg/m2          Blood Pressure          Most Recent Value    BP  128/72      Allergies as of 5/5/2017     Bactrim [Sulfamethoxazole-trimethoprim]    Iodine And Iodide Containing Products    Keflex [Cephalexin]    Shellfish Containing Products      Immunizations Administered on Date of Encounter - 5/5/2017     None      [facility] Sign-Up     Activating your [facility] account is as easy as 1-2-3!     1) Visit [email or web address], select Sign Up Now, enter this activation code and your date of birth, then select Next.  [email or web address]  Expires: 5/15/2017 11:58 AM      2) Create a username and password to use when you visit [facility] in the future and select a security question in case you lose your password and select Next.    3) Enter your e-mail address and click Sign Up!    Additional Information  If you have questions, please e-mail [email or web address] or call [phone] " to talk to our [facility] staff. Remember, [facility] is NOT to be used for urgent needs. For medical emergencies, dial 911.         Language Assistance Services     ATTENTION: Language assistance services are available, free of charge. Please call [phone].      ATENCIÓN: Si abimbola daniel, tiene a berumen disposición servicios gratuitos de asistencia lingüística. Llame al [phone].     CHÚ Ý: N?u b?n nói Ti?ng Vi?t, có các d?ch v? h? tr? ngôn ng? mi?n phí dành cho b?n. G?i s? [phone].         [facility] complies with applicable Federal civil rights laws and does not discriminate on the basis of race, color, national origin, age, disability, or sex.

## 2017-05-08 RX ORDER — PANTOPRAZOLE SODIUM 40 MG/1
TABLET, DELAYED RELEASE ORAL
Qty: 90 TABLET | Refills: 2 | Status: SHIPPED | OUTPATIENT
Start: 2017-05-08 | End: 2017-10-30

## 2017-05-18 RX ORDER — CARBAMAZEPINE 200 MG/1
TABLET ORAL
Qty: 180 TABLET | Refills: 2 | Status: SHIPPED | OUTPATIENT
Start: 2017-05-18 | End: 2017-10-30

## 2017-08-07 DIAGNOSIS — E78.00 HYPERCHOLESTEROLEMIA: ICD-10-CM

## 2017-08-07 RX ORDER — PRAVASTATIN SODIUM 80 MG/1
TABLET ORAL
Qty: 90 TABLET | Refills: 0 | Status: SHIPPED | OUTPATIENT
Start: 2017-08-07 | End: 2017-10-30

## 2017-08-07 RX ORDER — GEMFIBROZIL 600 MG/1
TABLET, FILM COATED ORAL
Qty: 180 TABLET | Refills: 1 | Status: SHIPPED | OUTPATIENT
Start: 2017-08-07 | End: 2017-10-30 | Stop reason: SDUPTHER

## 2017-08-16 DIAGNOSIS — E03.2 HYPOTHYROIDISM DUE TO NON-MEDICATION EXOGENOUS SUBSTANCES: ICD-10-CM

## 2017-08-16 RX ORDER — FAMOTIDINE 20 MG/1
TABLET, FILM COATED ORAL
Qty: 180 TABLET | Refills: 1 | Status: SHIPPED | OUTPATIENT
Start: 2017-08-16 | End: 2017-10-30

## 2017-08-16 RX ORDER — LEVOTHYROXINE SODIUM 100 UG/1
TABLET ORAL
Qty: 90 TABLET | Refills: 0 | Status: SHIPPED | OUTPATIENT
Start: 2017-08-16 | End: 2017-10-30 | Stop reason: SDUPTHER

## 2017-10-30 ENCOUNTER — OFFICE VISIT (OUTPATIENT)
Dept: FAMILY MEDICINE | Facility: CLINIC | Age: 77
End: 2017-10-30
Payer: MEDICARE

## 2017-10-30 VITALS
WEIGHT: 196.63 LBS | HEIGHT: 66 IN | TEMPERATURE: 98 F | SYSTOLIC BLOOD PRESSURE: 144 MMHG | BODY MASS INDEX: 31.6 KG/M2 | OXYGEN SATURATION: 97 % | DIASTOLIC BLOOD PRESSURE: 82 MMHG | HEART RATE: 52 BPM

## 2017-10-30 DIAGNOSIS — R35.0 URINARY FREQUENCY: Primary | ICD-10-CM

## 2017-10-30 DIAGNOSIS — Z23 NEED FOR INFLUENZA VACCINATION: ICD-10-CM

## 2017-10-30 DIAGNOSIS — Z23 NEED FOR PNEUMOCOCCAL VACCINATION: ICD-10-CM

## 2017-10-30 DIAGNOSIS — E03.2 HYPOTHYROIDISM DUE TO NON-MEDICATION EXOGENOUS SUBSTANCES: ICD-10-CM

## 2017-10-30 DIAGNOSIS — E78.00 HYPERCHOLESTEROLEMIA: ICD-10-CM

## 2017-10-30 LAB
BILIRUB SERPL-MCNC: ABNORMAL MG/DL
BLOOD URINE, POC: ABNORMAL
COLOR, POC UA: YELLOW
GLUCOSE UR QL STRIP: ABNORMAL
KETONES UR QL STRIP: ABNORMAL
LEUKOCYTE ESTERASE URINE, POC: ABNORMAL
NITRITE, POC UA: ABNORMAL
PH, POC UA: 5
PROTEIN, POC: 30
SPECIFIC GRAVITY, POC UA: 1.01
UROBILINOGEN, POC UA: ABNORMAL

## 2017-10-30 PROCEDURE — 87088 URINE BACTERIA CULTURE: CPT

## 2017-10-30 PROCEDURE — G0008 ADMIN INFLUENZA VIRUS VAC: HCPCS | Mod: S$GLB,,, | Performed by: FAMILY MEDICINE

## 2017-10-30 PROCEDURE — G0009 ADMIN PNEUMOCOCCAL VACCINE: HCPCS | Mod: S$GLB,,, | Performed by: FAMILY MEDICINE

## 2017-10-30 PROCEDURE — 81002 URINALYSIS NONAUTO W/O SCOPE: CPT | Mod: S$GLB,,, | Performed by: FAMILY MEDICINE

## 2017-10-30 PROCEDURE — 99214 OFFICE O/P EST MOD 30 MIN: CPT | Mod: 25,S$GLB,, | Performed by: FAMILY MEDICINE

## 2017-10-30 PROCEDURE — 87086 URINE CULTURE/COLONY COUNT: CPT

## 2017-10-30 PROCEDURE — 90732 PPSV23 VACC 2 YRS+ SUBQ/IM: CPT | Mod: S$GLB,,, | Performed by: FAMILY MEDICINE

## 2017-10-30 PROCEDURE — 90662 IIV NO PRSV INCREASED AG IM: CPT | Mod: S$GLB,,, | Performed by: FAMILY MEDICINE

## 2017-10-30 PROCEDURE — 87077 CULTURE AEROBIC IDENTIFY: CPT

## 2017-10-30 PROCEDURE — 87186 SC STD MICRODIL/AGAR DIL: CPT

## 2017-10-30 RX ORDER — PRAVASTATIN SODIUM 80 MG/1
80 TABLET ORAL DAILY
Qty: 90 TABLET | Refills: 3 | Status: SHIPPED | OUTPATIENT
Start: 2017-10-30 | End: 2018-08-14 | Stop reason: SDUPTHER

## 2017-10-30 RX ORDER — GEMFIBROZIL 600 MG/1
600 TABLET, FILM COATED ORAL 2 TIMES DAILY
Qty: 180 TABLET | Refills: 3 | Status: SHIPPED | OUTPATIENT
Start: 2017-10-30 | End: 2018-08-14 | Stop reason: SDUPTHER

## 2017-10-30 RX ORDER — LEVOTHYROXINE SODIUM 100 UG/1
100 TABLET ORAL
Qty: 90 TABLET | Refills: 3 | Status: SHIPPED | OUTPATIENT
Start: 2017-10-30 | End: 2018-08-14 | Stop reason: SDUPTHER

## 2017-10-30 RX ORDER — CARBAMAZEPINE 200 MG/1
200 TABLET ORAL 2 TIMES DAILY
Qty: 180 TABLET | Refills: 2 | Status: SHIPPED | OUTPATIENT
Start: 2017-10-30 | End: 2018-07-26 | Stop reason: SDUPTHER

## 2017-10-30 RX ORDER — FOLIC ACID 1 MG/1
TABLET ORAL
COMMUNITY
Start: 2017-10-01 | End: 2018-08-14 | Stop reason: SDUPTHER

## 2017-10-30 RX ORDER — CALCITRIOL 0.5 UG/1
CAPSULE ORAL
COMMUNITY
Start: 2017-08-31

## 2017-10-30 RX ORDER — CARVEDILOL 25 MG/1
25 TABLET ORAL 2 TIMES DAILY WITH MEALS
Qty: 180 TABLET | Refills: 3 | Status: SHIPPED | OUTPATIENT
Start: 2017-10-30 | End: 2018-08-14

## 2017-10-30 RX ORDER — FERROUS SULFATE 325(65) MG
325 TABLET ORAL 2 TIMES DAILY
Qty: 180 TABLET | Refills: 3 | Status: SHIPPED | OUTPATIENT
Start: 2017-10-30 | End: 2018-08-14 | Stop reason: SDUPTHER

## 2017-10-30 RX ORDER — CINACALCET HYDROCHLORIDE 90 MG/1
TABLET, COATED ORAL
COMMUNITY
Start: 2017-09-26

## 2017-10-30 RX ORDER — HYDROCHLOROTHIAZIDE 50 MG/1
50 TABLET ORAL DAILY
Qty: 90 TABLET | Refills: 3 | Status: SHIPPED | OUTPATIENT
Start: 2017-10-30 | End: 2018-08-14 | Stop reason: SDUPTHER

## 2017-10-30 RX ORDER — HYDROCHLOROTHIAZIDE 50 MG/1
TABLET ORAL
COMMUNITY
Start: 2017-08-09 | End: 2017-10-30 | Stop reason: SDUPTHER

## 2017-11-02 LAB — BACTERIA UR CULT: NORMAL

## 2017-11-03 ENCOUNTER — TELEPHONE (OUTPATIENT)
Dept: FAMILY MEDICINE | Facility: CLINIC | Age: 77
End: 2017-11-03

## 2017-11-03 RX ORDER — ETODOLAC 400 MG/1
TABLET, FILM COATED ORAL
Qty: 180 TABLET | Refills: 0 | OUTPATIENT
Start: 2017-11-03

## 2017-11-03 NOTE — TELEPHONE ENCOUNTER
I would not treat this BC count of bacteria is too low    Are you experiencing any new urinary symptoms?

## 2017-11-03 NOTE — TELEPHONE ENCOUNTER
Dr Vides is referring culture  I notified the pt   She has no urinary pain or burning   I advised no treatment needed at this time

## 2017-11-06 NOTE — PROGRESS NOTES
Patient ID: Nallely Doty is a 77 y.o. female.    Chief Complaint: Annual Exam    HPI      Nallely Doty is a 77 y.o. female here for annual exam.  Overall improving over the last several months.  Continues to follow-up with nephrology for chronic kidney disease.  Currently slight increase in urinary frequency without dysuria, without changing color older urine.  No fever chills nausea vomiting diarrhea  Lipidemia-stable on current medicines  Hypothyroidism-stable on Synthroid without problems  Muscle skeletal debility-walks with walker no current problems    Review of Symptoms    Constitutional  Neg activity change, No chills /fever   Resp  Neg hemoptysis, stridor, choking  CVS  Neg chest pain, palpitations    Physical Exam    Constitutional:  Oriented to person, place, and time.appears well-developed and well-nourished.  No distress.   Sitting in chair --uses a walker     HENT  Head: Normocephalic and atraumatic  Right Ear: External ear normal.   Left Ear: External ear normal.   Nose: External nose normal.   Mouth:  Moist mucus membranes.    Eyes:  Conjunctivae are normal. Right eye exhibits no discharge.  Left eye exhibits no discharge. No scleral icterus.  No periorbital edema    Cardiovascular:  Regular rate, Regular rhythm     No extrasystole  Normal S1-S2      Pulmonary/Chest:   Normal effort and breath sounds.  No wheezing, rhonchi or rales.    Musculoskeletal:  No edema. No obvious deformity No wasting       Neurological:  Alert and oriented to person, place, and time.   Coordination normal.     Skin:   Skin is warm and dry.  No diaphoresis.   No rash noted.     Psychiatric: Normal mood and affect. Behavior is normal.  Judgment and thought content normal.       Assessment / Plan:      ICD-10-CM ICD-9-CM   1. Urinary frequency R35.0 788.41   2. Need for influenza vaccination Z23 V04.81   3. Need for pneumococcal vaccination Z23 V03.82   4. Hypercholesterolemia  E78.00 272.0   5. 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  Dispense: 90 tablet; Refill: 3    Hypothyroidism due to non-medication exogenous substances  -     levothyroxine (SYNTHROID) 100 MCG tablet; Take 1 tablet (100 mcg total) by mouth before breakfast.  Dispense: 90 tablet; Refill: 3    Other orders  -     carvedilol (COREG) 25 MG tablet; Take 1 tablet (25 mg total) by mouth 2 (two) times daily with meals.  Dispense: 180 tablet; Refill: 3  -     hydroCHLOROthiazide (HYDRODIURIL) 50 MG tablet; Take 1 tablet (50 mg total) by mouth once daily.  Dispense: 90 tablet; Refill: 3  -     gemfibrozil (LOPID) 600 MG tablet; Take 1 tablet (600 mg total) by mouth 2 (two) times daily.  Dispense: 180 tablet; Refill: 3  -     ferrous sulfate 325 mg (65 mg iron) Tab tablet; Take 1 tablet (325 mg total) by mouth 2 (two) times daily.  Dispense: 180 tablet; Refill: 3  -     carBAMazepine (TEGRETOL) 200 mg tablet; Take 1 tablet (200 mg total) by mouth 2 (two) times daily.  Dispense: 180 tablet; Refill: 2

## 2018-02-05 RX ORDER — PANTOPRAZOLE SODIUM 40 MG/1
TABLET, DELAYED RELEASE ORAL
Qty: 90 TABLET | Refills: 2 | Status: SHIPPED | OUTPATIENT
Start: 2018-02-05 | End: 2018-08-11

## 2018-02-12 RX ORDER — FAMOTIDINE 20 MG/1
TABLET, FILM COATED ORAL
Qty: 180 TABLET | Refills: 1 | Status: SHIPPED | OUTPATIENT
Start: 2018-02-12 | End: 2018-08-10 | Stop reason: SDUPTHER

## 2018-06-06 LAB
BUN BLD-MCNC: 36 MG/DL (ref 4–21)
BUN/CREAT SERPL: 24
CALCIUM SERPL-MCNC: 9.8 MG/DL
CARBON DIOXIDE, CO2: 23
CHLORIDE: 110
CREAT SERPL-MCNC: 1.5 MG/DL
EGFR IF AFRICAN AMERICAN: 38
EST. GFR  (NON AFRICAN AMERICAN): 32 MG/DL
GLUCOSE: 101
POTASSIUM: 4.5
PTH-INTACT SERPL-MCNC: 412 PG/ML
SODIUM: 142

## 2018-06-15 ENCOUNTER — TELEPHONE (OUTPATIENT)
Dept: ADMINISTRATIVE | Facility: HOSPITAL | Age: 78
End: 2018-06-15

## 2018-07-27 RX ORDER — CARBAMAZEPINE 200 MG/1
TABLET ORAL
Qty: 180 TABLET | Refills: 2 | Status: SHIPPED | OUTPATIENT
Start: 2018-07-27 | End: 2018-08-14 | Stop reason: SDUPTHER

## 2018-08-11 RX ORDER — FAMOTIDINE 20 MG/1
TABLET, FILM COATED ORAL
Qty: 180 TABLET | Refills: 1 | Status: SHIPPED | OUTPATIENT
Start: 2018-08-11 | End: 2018-08-14

## 2018-08-14 ENCOUNTER — OFFICE VISIT (OUTPATIENT)
Dept: FAMILY MEDICINE | Facility: CLINIC | Age: 78
End: 2018-08-14
Payer: MEDICARE

## 2018-08-14 VITALS
WEIGHT: 199.5 LBS | SYSTOLIC BLOOD PRESSURE: 116 MMHG | DIASTOLIC BLOOD PRESSURE: 72 MMHG | TEMPERATURE: 98 F | HEIGHT: 66 IN | HEART RATE: 71 BPM | OXYGEN SATURATION: 96 % | BODY MASS INDEX: 32.06 KG/M2

## 2018-08-14 DIAGNOSIS — R73.9 HYPERGLYCEMIA: ICD-10-CM

## 2018-08-14 DIAGNOSIS — M05.9 RHEUMATOID ARTHRITIS WITH POSITIVE RHEUMATOID FACTOR, INVOLVING UNSPECIFIED SITE: ICD-10-CM

## 2018-08-14 DIAGNOSIS — J98.4 CHRONIC PULMONARY DISEASE: ICD-10-CM

## 2018-08-14 DIAGNOSIS — J98.4 PULMONARY DISEASE: ICD-10-CM

## 2018-08-14 DIAGNOSIS — M11.20 PSEUDOGOUT: ICD-10-CM

## 2018-08-14 DIAGNOSIS — I95.9 HYPOTENSION, UNSPECIFIED HYPOTENSION TYPE: ICD-10-CM

## 2018-08-14 DIAGNOSIS — N95.9 MENOPAUSAL AND POSTMENOPAUSAL DISORDER: ICD-10-CM

## 2018-08-14 DIAGNOSIS — E03.2 HYPOTHYROIDISM DUE TO NON-MEDICATION EXOGENOUS SUBSTANCES: Primary | ICD-10-CM

## 2018-08-14 DIAGNOSIS — M14.671 CHARCOT'S JOINT OF RIGHT FOOT: ICD-10-CM

## 2018-08-14 DIAGNOSIS — E78.00 HYPERCHOLESTEROLEMIA: ICD-10-CM

## 2018-08-14 PROCEDURE — 99214 OFFICE O/P EST MOD 30 MIN: CPT | Mod: S$GLB,,, | Performed by: FAMILY MEDICINE

## 2018-08-14 RX ORDER — CARVEDILOL 12.5 MG/1
12.5 TABLET ORAL DAILY
COMMUNITY
Start: 2018-06-06 | End: 2018-08-14 | Stop reason: SDUPTHER

## 2018-08-14 RX ORDER — FOLIC ACID 1 MG/1
1 TABLET ORAL DAILY
Qty: 90 TABLET | Refills: 3 | Status: SHIPPED | OUTPATIENT
Start: 2018-08-14 | End: 2019-09-01 | Stop reason: SDUPTHER

## 2018-08-14 RX ORDER — FERROUS SULFATE 325(65) MG
325 TABLET ORAL 2 TIMES DAILY
Qty: 180 TABLET | Refills: 3 | Status: SHIPPED | OUTPATIENT
Start: 2018-08-14 | End: 2019-10-07 | Stop reason: SDUPTHER

## 2018-08-14 RX ORDER — IPRATROPIUM BROMIDE AND ALBUTEROL SULFATE 2.5; .5 MG/3ML; MG/3ML
3 SOLUTION RESPIRATORY (INHALATION) EVERY 6 HOURS PRN
Qty: 270 VIAL | Refills: 4 | Status: SHIPPED | OUTPATIENT
Start: 2018-08-14 | End: 2018-09-25 | Stop reason: SDUPTHER

## 2018-08-14 RX ORDER — AMLODIPINE BESYLATE 5 MG/1
1 TABLET ORAL DAILY
COMMUNITY
Start: 2018-08-09

## 2018-08-14 RX ORDER — CARBAMAZEPINE 200 MG/1
200 TABLET ORAL 2 TIMES DAILY
Qty: 180 TABLET | Refills: 2 | Status: SHIPPED | OUTPATIENT
Start: 2018-08-14 | End: 2019-09-24 | Stop reason: SDUPTHER

## 2018-08-14 RX ORDER — GEMFIBROZIL 600 MG/1
600 TABLET, FILM COATED ORAL 2 TIMES DAILY
Qty: 180 TABLET | Refills: 3 | Status: SHIPPED | OUTPATIENT
Start: 2018-08-14 | End: 2019-10-22 | Stop reason: SDUPTHER

## 2018-08-14 RX ORDER — DICLOFENAC SODIUM 1 MG/ML
SOLUTION/ DROPS OPHTHALMIC
COMMUNITY
Start: 2018-08-11

## 2018-08-14 RX ORDER — LEVOTHYROXINE SODIUM 100 UG/1
100 TABLET ORAL
Qty: 90 TABLET | Refills: 3 | Status: SHIPPED | OUTPATIENT
Start: 2018-08-14 | End: 2019-09-19 | Stop reason: SDUPTHER

## 2018-08-14 RX ORDER — CARVEDILOL 12.5 MG/1
12.5 TABLET ORAL DAILY
Qty: 180 TABLET | Refills: 3 | Status: SHIPPED | OUTPATIENT
Start: 2018-08-14 | End: 2018-08-24 | Stop reason: SDUPTHER

## 2018-08-14 RX ORDER — HYDROCHLOROTHIAZIDE 50 MG/1
50 TABLET ORAL DAILY
Qty: 90 TABLET | Refills: 3 | Status: SHIPPED | OUTPATIENT
Start: 2018-08-14

## 2018-08-14 RX ORDER — PRAVASTATIN SODIUM 80 MG/1
80 TABLET ORAL DAILY
Qty: 90 TABLET | Refills: 3 | Status: SHIPPED | OUTPATIENT
Start: 2018-08-14 | End: 2019-10-22 | Stop reason: SDUPTHER

## 2018-08-14 NOTE — LETTER
August 14, 2018    Nallely Doty  [address] [address]         [facility]  [address] [address]  Phone: [phone]  Fax: [phone] August 14, 2018     Patient: Nallely Doty   YOB: 1940   Date of Visit: 8/14/2018       To Whom It May Concern:    It is my medical opinion that Nallely Doty is in very good physical condition and casey can operate her vehicle safely.    If you have any questions or concerns, please don't hesitate to call.    Sincerely,        Gabriel Vides MD

## 2018-08-14 NOTE — LETTER
August 14, 2018    Nallely Doty  [address] [facility] [address]         [facility]  [address] [address]  Phone: [phone]  Fax: [phone] August 14, 2018     Patient: Nallely Doty   YOB: 1940   Date of Visit: 8/14/2018       To Whom It May Concern:    It is my medical opinion that Nallely Doty very good physical condition and in my opinion can operate her vehicle safely.    If you have any questions or concerns, please don't hesitate to call.    Sincerely,        Gabriel Vides MD

## 2018-08-17 ENCOUNTER — HOSPITAL ENCOUNTER (OUTPATIENT)
Dept: RADIOLOGY | Facility: HOSPITAL | Age: 78
Discharge: HOME | End: 2018-08-17
Attending: FAMILY MEDICINE
Payer: MEDICARE

## 2018-08-17 DIAGNOSIS — N95.9 MENOPAUSAL AND POSTMENOPAUSAL DISORDER: ICD-10-CM

## 2018-08-17 PROCEDURE — 77081 DXA BONE DENSITY APPENDICULR: CPT | Mod: TC,PO

## 2018-08-19 NOTE — PROGRESS NOTES
Patient ID: Nallely Doty is a 78 y.o. female.    Chief Complaint: check up/ med refills    HPI      Nallely Doty is a 78 y.o. female here for checkup.  Patient with multiple medical problems including renal insufficiency-followed by Nephrology.  She has hypertension which is currently controlled.  Hypothyroidism-stable  Lipidemia-stable on her medications without myalgia            Review of Symptoms    Constitutional  Neg activity change uses a walker, No chills /fever   Resp  Neg hemoptysis, stridor, choking  CVS  Neg chest pain, palpitations    Physical Exam    Constitutional:  Oriented to person, place, and time.appears well-developed and well-nourished.  No distress.   Appears her stated age-walks with a walker comfortably     HENT  Head: Normocephalic and atraumatic  Right Ear: External ear normal.   Left Ear: External ear normal.   Nose: External nose normal.   Mouth:  Moist mucus membranes.    Eyes:  Conjunctivae are normal. Right eye exhibits no discharge.  Left eye exhibits no discharge. No scleral icterus.  No periorbital edema    Cardiovascular:  Regular rate, Regular rhythm     No extrasystole  Normal S1-S2      Pulmonary/Chest:   Normal effort and breath sounds.  No wheezing, rhonchi or rales.    Musculoskeletal:  No edema. No obvious deformity No wasting  Global decrease in strength and range of motion appropriate for age and condition       Neurological:  Alert and oriented to person, place, and time.   Coordination normal.     Skin:   Skin is warm and dry.  No diaphoresis.   No rash noted.     Psychiatric: Normal mood and affect. Behavior is normal.  Judgment and thought content normal.     Complete Blood Count  Lab Results   Component Value Date    RBC 3.58 (L) 07/15/2016    HGB 11.3 (L) 07/15/2016    HCT 34.2 (L) 07/15/2016    MCV 95.5 07/15/2016    MCH 31.4 07/15/2016    MCHC 32.9 07/15/2016    RDW 13.1 07/15/2016     07/15/2016    MPV 8.2 07/15/2016    GRAN 3.2 05/20/2016     GRAN 56.7 05/20/2016    LYMPH 1,302 07/15/2016    LYMPH 21.0 07/15/2016    MONO 719 07/15/2016    MONO 11.6 07/15/2016     07/15/2016    BASO 37 07/15/2016    EOSINOPHIL 2.7 07/15/2016    BASOPHIL 0.6 07/15/2016    DIFFMETHOD Automated 05/20/2016       Comprehensive Metabolic Panel  Lab Results   Component Value Date    BUN 36 (A) 06/06/2018    CREATININE 1.5 06/06/2018     06/06/2018    K 4.5 06/06/2018     06/06/2018    CO2 23 06/06/2018    EGFRNONAA 32.0 06/06/2018       TSH  No results found for: TSH    Assessment / Plan:      ICD-10-CM ICD-9-CM   1. Hypothyroidism due to non-medication exogenous substances E03.2 244.3   2. Hypercholesterolemia  E78.00 272.0   3. Menopausal and postmenopausal disorder N95.9 627.9   4. Chronic pulmonary disease J98.4 518.89   5. Pseudogout M11.20 275.49     712.20   6. Pulmonary disease J98.4 518.89   7. Charcot's joint of right foot M14.671 094.0     713.5   8. Rheumatoid arthritis with positive rheumatoid factor, involving unspecified site M05.9 714.0   9. Hypotension, unspecified hypotension type I95.9 458.9   10. Hyperglycemia R73.9 790.29     Hypothyroidism due to non-medication exogenous substances    Hypercholesterolemia     Menopausal and postmenopausal disorder  -     DXA Bone Density Appendicular Skeleton; Future; Expected date: 08/14/2018    Chronic pulmonary disease  -     albuterol-ipratropium (DUO-NEB) 2.5 mg-0.5 mg/3 mL nebulizer solution; Take 3 mLs by nebulization every 6 (six) hours as needed for Wheezing.  Dispense: 270 vial; Refill: 4    Pseudogout    Pulmonary disease    Charcot's joint of right foot    Rheumatoid arthritis with positive rheumatoid factor, involving unspecified site    Hypotension, unspecified hypotension type    Hyperglycemia  -     Hemoglobin A1c; Future    Other orders  -     blood sugar diagnostic Strp; 1 strip by Misc.(Non-Drug; Combo Route) route once daily.  Dispense: 100 strip; Refill: 3  -     carBAMazepine  (TEGRETOL) 200 mg tablet; Take 1 tablet (200 mg total) by mouth 2 (two) times daily.  Dispense: 180 tablet; Refill: 2  -     carvedilol (COREG) 12.5 MG tablet; Take 1 tablet (12.5 mg total) by mouth once daily.  Dispense: 180 tablet; Refill: 3  -     ferrous sulfate 325 mg (65 mg iron) Tab tablet; Take 1 tablet (325 mg total) by mouth 2 (two) times daily.  Dispense: 180 tablet; Refill: 3  -     folic acid (FOLVITE) 1 MG tablet; Take 1 tablet (1 mg total) by mouth once daily.  Dispense: 90 tablet; Refill: 3  -     gemfibrozil (LOPID) 600 MG tablet; Take 1 tablet (600 mg total) by mouth 2 (two) times daily.  Dispense: 180 tablet; Refill: 3  -     hydroCHLOROthiazide (HYDRODIURIL) 50 MG tablet; Take 1 tablet (50 mg total) by mouth once daily.  Dispense: 90 tablet; Refill: 3  -     levothyroxine (SYNTHROID) 100 MCG tablet; Take 1 tablet (100 mcg total) by mouth before breakfast.  Dispense: 90 tablet; Refill: 3  -     pravastatin (PRAVACHOL) 80 MG tablet; Take 1 tablet (80 mg total) by mouth once daily.  Dispense: 90 tablet; Refill: 3      Discussed recent visits to specialist-review lab work to be order

## 2018-08-20 NOTE — TELEPHONE ENCOUNTER
----- Message from Lashay Candelario sent at 8/20/2018  9:40 AM CDT -----  No. [phone]    Patient needs testing strips called into [facility] in [place].        I tried to call the pt for more info. What brand and how often does she test?  No answer and unable to leave a message.

## 2018-08-21 ENCOUNTER — TELEPHONE (OUTPATIENT)
Dept: FAMILY MEDICINE | Facility: CLINIC | Age: 78
End: 2018-08-21

## 2018-08-21 DIAGNOSIS — J98.4 CHRONIC PULMONARY DISEASE: ICD-10-CM

## 2018-08-24 ENCOUNTER — TELEPHONE (OUTPATIENT)
Dept: FAMILY MEDICINE | Facility: CLINIC | Age: 78
End: 2018-08-24

## 2018-08-24 RX ORDER — CARVEDILOL 12.5 MG/1
12.5 TABLET ORAL 2 TIMES DAILY
Qty: 180 TABLET | Refills: 3 | Status: SHIPPED | OUTPATIENT
Start: 2018-08-24 | End: 2019-08-20 | Stop reason: SDUPTHER

## 2018-08-24 NOTE — LETTER
August 27, 2018    Dr. Harvinder Lamb  Internal medicine - nephrology   [address] [facility] [address], [place] [address]               [facility]  [address] [place] [address]  Phone: [phone]  Fax: [phone]   Patient: Nallely Doty   MR Number: [id number]   YOB: 1940   Date of Visit: 8/24/2018       Dear Dr. Lamb:    Ms. Nallely Doty has osteoporosis and because of her debility she is at increased risk of fracture.  I would like to start her on medicine to preserve her bone density.  Given her renal function, do you have a preference of medications to start.      See attached DEXA scan        .  Sincerely,      Gabriel Vides MD

## 2018-08-24 NOTE — TELEPHONE ENCOUNTER
I spoke with the pt     She sees nephrologist -  Dr FAISAL Lamb at   Phone # is [phone]  I advised her we will call her back once we figure out what meds we will use to treat her osteoporosis    Are you going to put in a call to his office?

## 2018-08-24 NOTE — TELEPHONE ENCOUNTER
I refilled her prescription carvedilol 12.5       because previously sent for quantity 180 sent a prescription for 1 pill 2 times a day..  Her blood pressure last time was a little low.  If she is only taking it once a day she should continue only 1 time daily     Secondly her bone density test showed osteoporosis and I suggest we consider starting on medication.  I would like to talk to her nephrologist 1st-  as to opinion if particular medicines are safe with her current disease process

## 2018-08-24 NOTE — TELEPHONE ENCOUNTER
----- Message from Yudi Chaparro sent at 8/24/2018  8:26 AM CDT -----  Contact: [facility]/[phone]  ref#[id number]  The prescription is written for 180 tablets; the sig says 1 tablet but looks like the patient is taking twice per today.    carvedilol (COREG) 12.5 MG tablet 180 tablet   Sig - Route: Take 1 tablet (12.5 mg total) by mouth once daily. - Oral

## 2018-08-30 RX ORDER — ALENDRONATE SODIUM 70 MG/1
70 TABLET ORAL
Qty: 12 TABLET | Refills: 3 | Status: SHIPPED | OUTPATIENT
Start: 2018-08-30 | End: 2019-07-17 | Stop reason: SDUPTHER

## 2018-08-30 NOTE — TELEPHONE ENCOUNTER
I communicated with the nephrologist he is fine with using Fosamax weekly as I prescribed    I sent it to [facility]

## 2018-09-13 LAB — HBA1C MFR BLD: 5.5 % OF TOTAL HGB

## 2018-09-25 DIAGNOSIS — J98.4 CHRONIC PULMONARY DISEASE: ICD-10-CM

## 2018-09-25 RX ORDER — IPRATROPIUM BROMIDE AND ALBUTEROL SULFATE 2.5; .5 MG/3ML; MG/3ML
3 SOLUTION RESPIRATORY (INHALATION) EVERY 6 HOURS PRN
Qty: 270 VIAL | Refills: 4 | Status: SHIPPED | OUTPATIENT
Start: 2018-09-25 | End: 2019-10-14

## 2018-09-25 NOTE — TELEPHONE ENCOUNTER
----- Message from Amada Thomas sent at 9/25/2018  9:26 AM CDT -----  Contact: [phone]/self  Patient has never received her rx for albuterol-ipratropium (DUO-NEB) 2.5 mg-0.5 mg/3 mL nebulizer solution. Take 3 mLs by nebulization every 6 (six) hours as needed for Wheezing. - Nebulization    Was sent to [email or web address] - [facility], [place] - [address] [place] [address]    Send to: [facility]/PHARMACY #[id number] - [address] AT [facility]

## 2018-11-07 RX ORDER — PANTOPRAZOLE SODIUM 40 MG/1
TABLET, DELAYED RELEASE ORAL
Qty: 90 TABLET | Refills: 2 | Status: SHIPPED | OUTPATIENT
Start: 2018-11-07 | End: 2019-08-04 | Stop reason: SDUPTHER

## 2018-11-30 NOTE — TELEPHONE ENCOUNTER
----- Message from Lashay Candelario sent at 11/30/2018  9:57 AM CST -----  No. [phone]    Has the One Touch Verio test strips been called into [facility] in [place].

## 2018-12-27 ENCOUNTER — TELEPHONE (OUTPATIENT)
Dept: FAMILY MEDICINE | Facility: CLINIC | Age: 78
End: 2018-12-27

## 2018-12-27 NOTE — TELEPHONE ENCOUNTER
Clinical note faxed         ----- Message from Amada Thomas sent at 12/27/2018 11:00 AM CST -----  Contact: [facility][phone]  [facility]  is requesting clinical notes from her last visit sent to them. She is getting diabetic shoes.   Fax [phone]

## 2019-01-15 ENCOUNTER — TELEPHONE (OUTPATIENT)
Dept: FAMILY MEDICINE | Facility: CLINIC | Age: 79
End: 2019-01-15

## 2019-01-15 NOTE — TELEPHONE ENCOUNTER
I spoke with the pt daughter  Pt scheduled for Thursday  Pt has hx on cellulitis  She saw kidney last week - he recommended she follow up with dr haddad   She c/o itching feeling like cellulitis occassionally

## 2019-01-15 NOTE — TELEPHONE ENCOUNTER
----- Message from Alicia Winn sent at 1/15/2019  9:43 AM CST -----  Contact: Daughter [place] [phone]  Patient is requesting a call back regarding, she ask if you can see her this Thursday, she has Cellulitis. Please advise

## 2019-01-17 ENCOUNTER — OFFICE VISIT (OUTPATIENT)
Dept: FAMILY MEDICINE | Facility: CLINIC | Age: 79
End: 2019-01-17
Payer: MEDICARE

## 2019-01-17 VITALS
BODY MASS INDEX: 31.09 KG/M2 | DIASTOLIC BLOOD PRESSURE: 70 MMHG | WEIGHT: 193.44 LBS | SYSTOLIC BLOOD PRESSURE: 134 MMHG | HEART RATE: 70 BPM | HEIGHT: 66 IN | OXYGEN SATURATION: 98 % | TEMPERATURE: 98 F

## 2019-01-17 DIAGNOSIS — L03.818 CELLULITIS OF OTHER SPECIFIED SITE: Primary | ICD-10-CM

## 2019-01-17 PROCEDURE — 99213 PR OFFICE/OUTPT VISIT, EST, LEVL III, 20-29 MIN: ICD-10-PCS | Mod: S$GLB,,, | Performed by: FAMILY MEDICINE

## 2019-01-17 PROCEDURE — 99213 OFFICE O/P EST LOW 20 MIN: CPT | Mod: S$GLB,,, | Performed by: FAMILY MEDICINE

## 2019-01-17 RX ORDER — FAMOTIDINE 20 MG/1
TABLET, FILM COATED ORAL
COMMUNITY
Start: 2018-11-13 | End: 2019-02-06 | Stop reason: SDUPTHER

## 2019-01-17 RX ORDER — CEPHALEXIN 500 MG/1
1000 CAPSULE ORAL EVERY 12 HOURS
Qty: 28 CAPSULE | Refills: 0 | Status: SHIPPED | OUTPATIENT
Start: 2019-01-17 | End: 2019-01-17 | Stop reason: SDUPTHER

## 2019-01-17 RX ORDER — ERGOCALCIFEROL 1.25 MG/1
CAPSULE ORAL
COMMUNITY
Start: 2018-11-26

## 2019-01-17 RX ORDER — CEPHALEXIN 500 MG/1
1000 CAPSULE ORAL EVERY 12 HOURS
Qty: 28 CAPSULE | Refills: 2 | Status: SHIPPED | OUTPATIENT
Start: 2019-02-07 | End: 2019-10-14

## 2019-01-20 NOTE — PROGRESS NOTES
Patient ID: Nallely Doty is a 78 y.o. female.    Chief Complaint: Cellulitis    HPI      Nallely Doty is a 78 y.o. female here with complaints of left leg cellulitis.  Patient with cellulitis left medial thigh and proximal portion medially of lower leg.  Patient with multiple episodes of cellulitis.  Recently hospitalized and sent home on Zyvox p.o..  Consideration of prophylactic antibiotics?  Chronic kidney disease-sees nephrologist      Review of Symptoms    Constitutional  Neg activity change, No chills /fever   Resp  Neg hemoptysis, stridor, choking  CVS  Neg chest pain, palpitations    Physical Exam    Constitutional:  Oriented to person, place, and time.appears well-developed and well-nourished.  No distress.      HENT  Head: Normocephalic and atraumatic  Right Ear: External ear normal.   Left Ear: External ear normal.   Nose: External nose normal.   Mouth:  Moist mucus membranes.    Eyes:  Conjunctivae are normal. Right eye exhibits no discharge.  Left eye exhibits no discharge. No scleral icterus.  No periorbital edema    Cardiovascular:  Regular rate and rhythm with normal S1 and S2     Pulmonary/Chest:   Clear to auscultation bilaterally without wheezes, rhonchi or rales      Musculoskeletal:  No edema. No obvious deformity No wasting       Neurological:  Alert and oriented to person, place, and time.   Coordination normal.     Skin:   Skin is warm and dry.  No diaphoresis.   Area-medial thigh with slight erythema no at rays-no ulcerations    Psychiatric: Normal mood and affect. Behavior is normal.  Judgment and thought content normal.     Complete Blood Count  Lab Results   Component Value Date    RBC 3.58 (L) 07/15/2016    HGB 11.3 (L) 07/15/2016    HCT 34.2 (L) 07/15/2016    MCV 95.5 07/15/2016    MCH 31.4 07/15/2016    MCHC 32.9 07/15/2016    RDW 13.1 07/15/2016     07/15/2016    MPV 8.2 07/15/2016    GRAN 3.2 05/20/2016    GRAN 56.7 05/20/2016    LYMPH 1,302 07/15/2016    LYMPH  21.0 07/15/2016    MONO 719 07/15/2016    MONO 11.6 07/15/2016     07/15/2016    BASO 37 07/15/2016    EOSINOPHIL 2.7 07/15/2016    BASOPHIL 0.6 07/15/2016    DIFFMETHOD Automated 05/20/2016       Comprehensive Metabolic Panel  No results found for: GLU, BUN, CREATININE, NA, K, CL, PROT, ALBUMIN, BILITOT, AST, ALKPHOS, CO2, ALT, ANIONGAP, EGFRNONAA, ESTGFRAFRICA    TSH  No results found for: TSH    Assessment / Plan:      ICD-10-CM ICD-9-CM   1. Cellulitis of other specified site L03.818 682.8     Cellulitis of other specified site    Other orders  -     Discontinue: cephALEXin (KEFLEX) 500 MG capsule; Take 2 capsules (1,000 mg total) by mouth every 12 (twelve) hours.  Dispense: 28 capsule; Refill: 0  -     cephALEXin (KEFLEX) 500 MG capsule; Take 2 capsules (1,000 mg total) by mouth every 12 (twelve) hours.  Dispense: 28 capsule; Refill: 2

## 2019-02-07 ENCOUNTER — PES CALL (OUTPATIENT)
Dept: ADMINISTRATIVE | Facility: CLINIC | Age: 79
End: 2019-02-07

## 2019-02-07 RX ORDER — FAMOTIDINE 20 MG/1
TABLET, FILM COATED ORAL
Qty: 180 TABLET | Refills: 1 | Status: SHIPPED | OUTPATIENT
Start: 2019-02-07 | End: 2019-08-07 | Stop reason: SDUPTHER

## 2019-03-13 RX ORDER — BLOOD-GLUCOSE CONTROL, NORMAL
EACH MISCELLANEOUS
Qty: 100 EACH | Refills: 3 | Status: SHIPPED | OUTPATIENT
Start: 2019-03-13 | End: 2019-05-29 | Stop reason: SDUPTHER

## 2019-03-26 NOTE — TELEPHONE ENCOUNTER
Medicare has new guidelines for testing supplies; they will only cover once daily testing for non-insulin patient's and 3 times a day for patient's using insulin. Please send new Rx following the guidelines.

## 2019-05-16 ENCOUNTER — TELEPHONE (OUTPATIENT)
Dept: FAMILY MEDICINE | Facility: CLINIC | Age: 79
End: 2019-05-16

## 2019-05-16 DIAGNOSIS — Z51.81 ENCOUNTER FOR THERAPEUTIC DRUG LEVEL MONITORING: Primary | ICD-10-CM

## 2019-05-16 NOTE — TELEPHONE ENCOUNTER
I spoke with Sushil Gentile's office   He started her on eliquis but the pharmacy called and said that the eliquis will decrease the effectiveness of carbamazepine  Dr gentile said she needs to be on the eliquis  Please advise    I will call sushil or guanakito back with dr bell at [phone]

## 2019-05-17 NOTE — TELEPHONE ENCOUNTER
I ordered a carbamazepine level to be drawn.  I would do this in about two weeks.  This will tell us how much circulating medication you have in your system.  We can adjust according to the level.  Ordered in [facility]

## 2019-05-17 NOTE — TELEPHONE ENCOUNTER
I called and notified nguyễn at dr gentile office  Ok to take the Eliquis and carbamazepine    I advised the pt to have carbamazepine level drawn   In 2 weeks  I changed the orders for [facility]

## 2019-05-29 RX ORDER — BLOOD-GLUCOSE CONTROL, NORMAL
EACH MISCELLANEOUS
Qty: 100 EACH | Refills: 3 | Status: SHIPPED | OUTPATIENT
Start: 2019-05-29

## 2019-05-29 NOTE — TELEPHONE ENCOUNTER
The above pt is requesting that you please send her refills for her lancets and strips to [facility], please change the direction for her testing to be, from four times a day to two times a day, because she received a letter from medicare stating that they don't cover four times a day testings, please advice    Thanks

## 2019-05-29 NOTE — TELEPHONE ENCOUNTER
----- Message from Alicia Winn sent at 5/29/2019  3:09 PM CDT -----  Contact: self / [phone]  Patient is requesting a call back regarding, her diabetic strips. Please advise

## 2019-05-30 ENCOUNTER — TELEPHONE (OUTPATIENT)
Dept: FAMILY MEDICINE | Facility: CLINIC | Age: 79
End: 2019-05-30

## 2019-05-30 LAB — CARBAMAZEPINE SERPL-MCNC: 3.9 MG/L (ref 4–12)

## 2019-07-18 RX ORDER — ALENDRONATE SODIUM 70 MG/1
TABLET ORAL
Qty: 12 TABLET | Refills: 3 | Status: SHIPPED | OUTPATIENT
Start: 2019-07-18

## 2019-08-05 RX ORDER — PANTOPRAZOLE SODIUM 40 MG/1
TABLET, DELAYED RELEASE ORAL
Qty: 90 TABLET | Refills: 2 | Status: SHIPPED | OUTPATIENT
Start: 2019-08-05 | End: 2020-05-01 | Stop reason: SDUPTHER

## 2019-08-06 ENCOUNTER — TELEPHONE (OUTPATIENT)
Dept: FAMILY MEDICINE | Facility: CLINIC | Age: 79
End: 2019-08-06

## 2019-08-06 NOTE — TELEPHONE ENCOUNTER
BRENDEN   ----- Message from Shirlene Tenorio sent at 8/6/2019  2:13 PM CDT -----  Contact: Mary granados/ [place] [phone]   Mary is requesting for you to re-send and order for albuterol-ipratropium (DUO-NEB) 2.5 mg-0.5 mg/3 mL nebulizer solution. Mary states that she will fax over form and is requesting to please date the clear on the form. Fax: [phone]  Reference # [id number] Please advise

## 2019-08-07 RX ORDER — FAMOTIDINE 20 MG/1
TABLET, FILM COATED ORAL
Qty: 180 TABLET | Refills: 1 | Status: SHIPPED | OUTPATIENT
Start: 2019-08-07 | End: 2019-10-14

## 2019-08-20 RX ORDER — CARVEDILOL 12.5 MG/1
TABLET ORAL
Qty: 180 TABLET | Refills: 4 | Status: SHIPPED | OUTPATIENT
Start: 2019-08-20

## 2019-08-27 ENCOUNTER — TELEPHONE (OUTPATIENT)
Dept: FAMILY MEDICINE | Facility: CLINIC | Age: 79
End: 2019-08-27

## 2019-08-27 NOTE — TELEPHONE ENCOUNTER
----- Message from Claudine Burt sent at 8/27/2019 11:49 AM CDT -----  Contact: Tamela granados/ [facility] audit services [phone]  Type:  Pharmacy Calling to Clarify an RX    Name of Caller: Tamela  Pharmacy Name: [facility]  Prescription Name: diabetic test strips  What do they need to clarify?: audit - progress note for diabetic test strips  Best Call Back Number: [phone]  Additional Information:

## 2019-08-27 NOTE — TELEPHONE ENCOUNTER
I spoke with the pharmacy  RX dateDec 7, 2018 ( any   Notes pertaining to her DM 6 mo prior and up to 12/7/18 )    Fax # [phone]      I faxed august 2018 office note and 8/18 A1c

## 2019-09-01 RX ORDER — FOLIC ACID 1 MG/1
TABLET ORAL
Qty: 90 TABLET | Refills: 4 | Status: SHIPPED | OUTPATIENT
Start: 2019-09-01

## 2019-09-20 RX ORDER — LEVOTHYROXINE SODIUM 100 UG/1
TABLET ORAL
Qty: 90 TABLET | Refills: 4 | Status: SHIPPED | OUTPATIENT
Start: 2019-09-20

## 2019-09-24 RX ORDER — CARBAMAZEPINE 200 MG/1
TABLET ORAL
Qty: 180 TABLET | Refills: 4 | Status: SHIPPED | OUTPATIENT
Start: 2019-09-24

## 2019-10-07 RX ORDER — FERROUS SULFATE 325(65) MG
TABLET ORAL
Qty: 180 TABLET | Refills: 4 | Status: SHIPPED | OUTPATIENT
Start: 2019-10-07

## 2019-10-14 ENCOUNTER — OFFICE VISIT (OUTPATIENT)
Dept: FAMILY MEDICINE | Facility: CLINIC | Age: 79
End: 2019-10-14
Payer: MEDICARE

## 2019-10-14 VITALS
DIASTOLIC BLOOD PRESSURE: 72 MMHG | HEIGHT: 66 IN | HEART RATE: 69 BPM | TEMPERATURE: 97 F | OXYGEN SATURATION: 95 % | SYSTOLIC BLOOD PRESSURE: 106 MMHG | WEIGHT: 187.19 LBS | BODY MASS INDEX: 30.08 KG/M2

## 2019-10-14 DIAGNOSIS — E11.51 TYPE 2 DIABETES MELLITUS WITH DIABETIC PERIPHERAL ANGIOPATHY WITHOUT GANGRENE, WITHOUT LONG-TERM CURRENT USE OF INSULIN: Primary | ICD-10-CM

## 2019-10-14 DIAGNOSIS — J98.4 PULMONARY DISEASE: ICD-10-CM

## 2019-10-14 DIAGNOSIS — M14.671 CHARCOT'S JOINT OF RIGHT FOOT: ICD-10-CM

## 2019-10-14 DIAGNOSIS — I50.9 HEART FAILURE, UNSPECIFIED HF CHRONICITY, UNSPECIFIED HEART FAILURE TYPE: ICD-10-CM

## 2019-10-14 DIAGNOSIS — Z23 NEED FOR INFLUENZA VACCINATION: ICD-10-CM

## 2019-10-14 DIAGNOSIS — Z00.00 ROUTINE HEALTH MAINTENANCE: ICD-10-CM

## 2019-10-14 DIAGNOSIS — M25.562 CHRONIC PAIN OF LEFT KNEE: ICD-10-CM

## 2019-10-14 DIAGNOSIS — M05.9 RHEUMATOID ARTHRITIS WITH POSITIVE RHEUMATOID FACTOR, INVOLVING UNSPECIFIED SITE: ICD-10-CM

## 2019-10-14 DIAGNOSIS — G89.29 CHRONIC PAIN OF LEFT KNEE: ICD-10-CM

## 2019-10-14 PROCEDURE — 20610 DRAIN/INJ JOINT/BURSA W/O US: CPT | Mod: RT,S$GLB,, | Performed by: FAMILY MEDICINE

## 2019-10-14 PROCEDURE — 99214 OFFICE O/P EST MOD 30 MIN: CPT | Mod: 25,S$GLB,, | Performed by: FAMILY MEDICINE

## 2019-10-14 PROCEDURE — 99214 PR OFFICE/OUTPT VISIT, EST, LEVL IV, 30-39 MIN: ICD-10-PCS | Mod: 25,S$GLB,, | Performed by: FAMILY MEDICINE

## 2019-10-14 PROCEDURE — 20610 LARGE JOINT ASPIRATION/INJECTION: R KNEE: ICD-10-PCS | Mod: RT,S$GLB,, | Performed by: FAMILY MEDICINE

## 2019-10-14 RX ORDER — TRIAMCINOLONE ACETONIDE 40 MG/ML
40 INJECTION, SUSPENSION INTRA-ARTICULAR; INTRAMUSCULAR
Status: DISCONTINUED | OUTPATIENT
Start: 2019-10-14 | End: 2019-10-14 | Stop reason: HOSPADM

## 2019-10-14 RX ORDER — APIXABAN 5 MG/1
TABLET, FILM COATED ORAL
COMMUNITY
Start: 2019-07-31

## 2019-10-14 RX ADMIN — TRIAMCINOLONE ACETONIDE 40 MG: 40 INJECTION, SUSPENSION INTRA-ARTICULAR; INTRAMUSCULAR at 08:10

## 2019-10-14 NOTE — PROGRESS NOTES
" Patient ID: Nallely Doty is a 79 y.o. female.    Chief Complaint: Annual Exam    HPI      Nallely Doty is a 79 y.o. female. here for annual exam.   Co of right knee apin with ambulation.  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  Hypertension-controlled at this time  Reflux-stable  Charcot foot sees podiatrist for wound care        Review of Symptoms    Constitutional: Negative.    HENT: Negative.    Eyes: Negative.    Respiratory: Negative.    Cardiovascular: Negative.    Gastrointestinal: Negative.    Endocrine: Negative.    Genitourinary: Negative.    Musculoskeletal: Negative.    Skin: Negative.    Allergic/Immunologic: Negative.    Neurological: Negative.    Hematological: Negative.    Psychiatric/Behavioral: Negative.      Except as above in HPI      Vitals:    10/14/19 0830   BP: 106/72   Pulse: 69   Temp: 97.4 °F (36.3 °C)   SpO2: 95%   Weight: 84.9 kg (187 lb 2.7 oz)   Height: 5' 6" (1.676 m)        Physical  Exam      Constitutional:  Oriented to person, place, and time. Appears well-developed and well-nourished.     HENT:   Head: Normocephalic and atraumatic.     Right Ear: Tympanic membrane, ear canal and External ear normal     Left Ear: Tympanic membrane, ear canal and External ear normal     Nose: Nose normal. No rhinorrhea or nasal deformity.     Mouth/Throat: Uvula is midline, oropharynx is clear and moist and mucous membranes are normal.      Eyes: Conjunctivae are normal. Right eye exhibits no discharge. Left eye exhibits no discharge. No scleral icterus.     Neck:  No JVD present. No tracheal deviation  []  Neck supple.   []  No Carotid bruit    Cardiovascular:  Regular rate and rhythm with normal S1 and S2     Pulmonary/Chest:   Clear to auscultation bilaterally without wheezes, rhonchi or rales    Musculoskeletal: Normal range of motion. No edema or tenderness.   No deformity "     Lymphadenopathy:  No cervical adenopathy.     Neurological:  Alert and oriented to person, place, and time. Coordination normal.     Skin: Skin is warm and dry. No rash noted.     Psychiatric: Normal mood and affect. Speech is normal and behavior is normal. Judgment and thought content normal.     Complete Blood Count  Lab Results   Component Value Date    RBC 3.58 (L) 07/15/2016    HGB 11.3 (L) 07/15/2016    HCT 34.2 (L) 07/15/2016    MCV 95.5 07/15/2016    MCH 31.4 07/15/2016    MCHC 32.9 07/15/2016    RDW 13.1 07/15/2016     07/15/2016    MPV 8.2 07/15/2016    GRAN 3.2 05/20/2016    GRAN 56.7 05/20/2016    LYMPH 1,302 07/15/2016    LYMPH 21.0 07/15/2016    MONO 719 07/15/2016    MONO 11.6 07/15/2016     07/15/2016    BASO 37 07/15/2016    EOSINOPHIL 2.7 07/15/2016    BASOPHIL 0.6 07/15/2016    DIFFMETHOD Automated 05/20/2016       Comprehensive Metabolic Panel  No results found for: GLU, BUN, CREATININE, NA, K, CL, PROT, ALBUMIN, BILITOT, AST, ALKPHOS, CO2, ALT, ANIONGAP, EGFRNONAA, ESTGFRAFRICA    TSH  No results found for: TSH    Assessment / Plan:      ICD-10-CM ICD-9-CM   1. Type 2 diabetes mellitus with diabetic peripheral angiopathy without gangrene, without long-term current use of insulin E11.51 250.70     443.81   2. Need for influenza vaccination Z23 V04.81   3. Heart failure, unspecified HF chronicity, unspecified heart failure type I50.9 428.9   4. Chronic pain of left knee M25.562 719.46    G89.29 338.29   5. Routine health maintenance Z00.00 V70.0   6. Charcot's joint of right foot M14.671 094.0     713.5   7. Pulmonary disease J98.4 518.89   8. Rheumatoid arthritis with positive rheumatoid factor, involving unspecified site M05.9 714.0     Type 2 diabetes mellitus with diabetic peripheral angiopathy without gangrene, without long-term current use of insulin    Need for influenza vaccination    Heart failure, unspecified HF chronicity, unspecified heart failure type    Chronic pain  of left knee  -     Large Joint Aspiration/Injection: R knee  -     triamcinolone acetonide injection 40 mg    Routine health maintenance    Charcot's joint of right foot    Pulmonary disease    Rheumatoid arthritis with positive rheumatoid factor, involving unspecified site    Other orders  -     diabetic supplies, miscellan. Misc; Lancets for 1-2 times a day testing    dispense brand covered by insurance t  Dispense: 60 each; Refill: 3  -     blood sugar diagnostic (BLOOD GLUCOSE TEST) Strp; Strips for one to 2 times a day testing   dispense brand covered by insurance and to match meter brand  Dispense: 60 each; Refill: 3          Discussed how to stay healthy including: diet, exercise, refraining from smoking and discussed screening exams / tests needed for age, sex and family Hx.

## 2019-10-14 NOTE — PROCEDURES
Large Joint Aspiration/Injection: R knee  Date/Time: 10/14/2019 8:20 AM  Performed by: Gabriel Vides MD  Authorized by: Gabriel Vides MD     Consent Done?:  Yes (Verbal)  Indications:  Pain  Procedure site marked: Yes    Anesthesia  Local anesthesia used  Anesthesia: local infiltration    Location:  Knee  Site:  R knee  Needle gauge: 27.  Approach:  Anterolateral  Medications:  40 mg triamcinolone acetonide 40 mg/mL

## 2019-10-22 RX ORDER — GEMFIBROZIL 600 MG/1
TABLET, FILM COATED ORAL
Qty: 180 TABLET | Refills: 4 | Status: SHIPPED | OUTPATIENT
Start: 2019-10-22

## 2019-10-22 RX ORDER — PRAVASTATIN SODIUM 80 MG/1
TABLET ORAL
Qty: 90 TABLET | Refills: 4 | Status: SHIPPED | OUTPATIENT
Start: 2019-10-22

## 2019-11-07 ENCOUNTER — TELEPHONE (OUTPATIENT)
Dept: FAMILY MEDICINE | Facility: CLINIC | Age: 79
End: 2019-11-07

## 2019-11-07 NOTE — TELEPHONE ENCOUNTER
Added diagnosis code to order form and re-faxed to [facility]. [phone]      ----- Message from Molly Carrero sent at 11/7/2019 10:37 AM CST -----  Contact: Brandon Ricks at the diabetic store is requesting a callback concerning Pt diagnostic codes for pt supplies. Please call

## 2019-11-12 ENCOUNTER — TELEPHONE (OUTPATIENT)
Dept: FAMILY MEDICINE | Facility: CLINIC | Age: 79
End: 2019-11-12

## 2019-11-12 NOTE — TELEPHONE ENCOUNTER
----- Message from Jimbo Ewing sent at 11/12/2019  2:47 PM CST -----  Contact: Rambo [phone] can speak with anyone that answers the phone   Rambo at the [employer] store is requesting a callback concerning Pt diagnostic codes for pt supplies. Please call back to assist at [phone]

## 2019-11-12 NOTE — TELEPHONE ENCOUNTER
----- Message from Radha Basilio sent at 11/12/2019  3:25 PM CST -----  Contact: Carolina granados/ [facility] [phone]  Carolina is calling to let office know she will refax last prescription because last one sent had ineligible   Name and date.  Please advise.

## 2019-12-05 ENCOUNTER — PES CALL (OUTPATIENT)
Dept: ADMINISTRATIVE | Facility: CLINIC | Age: 79
End: 2019-12-05

## 2019-12-24 ENCOUNTER — OUTSIDE PLACE OF SERVICE (OUTPATIENT)
Dept: CARDIOLOGY | Facility: CLINIC | Age: 79
End: 2019-12-24
Payer: MEDICARE

## 2019-12-24 PROCEDURE — 93010 PR ELECTROCARDIOGRAM REPORT: ICD-10-PCS | Mod: ,,, | Performed by: INTERNAL MEDICINE

## 2019-12-24 PROCEDURE — 93010 ELECTROCARDIOGRAM REPORT: CPT | Mod: ,,, | Performed by: INTERNAL MEDICINE

## 2019-12-31 ENCOUNTER — DOCUMENTATION ONLY (OUTPATIENT)
Dept: HEPATOLOGY | Facility: HOSPITAL | Age: 79
End: 2019-12-31

## 2020-02-04 RX ORDER — FAMOTIDINE 20 MG/1
TABLET, FILM COATED ORAL
Qty: 180 TABLET | Refills: 4 | Status: SHIPPED | OUTPATIENT
Start: 2020-02-04

## 2020-05-01 RX ORDER — PANTOPRAZOLE SODIUM 40 MG/1
TABLET, DELAYED RELEASE ORAL
Qty: 90 TABLET | Refills: 3 | Status: SHIPPED | OUTPATIENT
Start: 2020-05-01

## 2020-05-06 ENCOUNTER — TELEPHONE (OUTPATIENT)
Dept: FAMILY MEDICINE | Facility: CLINIC | Age: 80
End: 2020-05-06

## 2020-05-06 NOTE — TELEPHONE ENCOUNTER
Broke hip at [facility]  And was in a nursing home   And also was being treated for foot ulcer  She was supposed to f/u with podiatrist   But he canceled due to covid   She is taking   Augmentin 875 mg monthly to prevent this   And now took 1 bid for 7 days  It did not help    The podiatrist will not treat her   bc he moved locations and never saw her in the new clinic      The foot and 3/4 up the knee is infected   Soaking the foot   bandaging it     Seeing wound care on Friday but can you change the abxin the mean time    Medicine shop

## 2020-05-06 NOTE — TELEPHONE ENCOUNTER
Spoke to pt and she states she will have her granddaughter call us to see if she can come in on tomorrow or is she can send a picture. Pt is going to see the foot doctor on this Friday.

## 2020-05-06 NOTE — TELEPHONE ENCOUNTER
----- Message from Elmer Gomez sent at 5/6/2020 11:44 AM CDT -----  Contact: Pt daughter Deandra  Pt daughter Deandra called and would like to have Dr. Scott or a nurse call her back    The pt has a diabetic foot ulcer    Deandra can be reached at [phone]

## 2020-05-06 NOTE — TELEPHONE ENCOUNTER
I dont know about that    If it is that infected does she need to go to the hospital?    Can they send a pic?  Can she come in ? Thursday?    The

## 2020-05-07 ENCOUNTER — PATIENT MESSAGE (OUTPATIENT)
Dept: FAMILY MEDICINE | Facility: CLINIC | Age: 80
End: 2020-05-07

## 2020-05-07 NOTE — TELEPHONE ENCOUNTER
David has set up a [employer] portal; she will send over pictures via portal.     ----- Message from Gurinder Ellis sent at 5/7/2020 10:43 AM CDT -----  Contact: david salguero / [phone]   Patient's granddaughter would like to speak with your office regarding the patient's infected ulcer on her foot, Kacy also states she was instructed to give your office a callback regarding new pictures of the foot. Please Advise.

## 2020-07-17 ENCOUNTER — PES CALL (OUTPATIENT)
Dept: ADMINISTRATIVE | Facility: CLINIC | Age: 80
End: 2020-07-17

## 2021-01-04 ENCOUNTER — PATIENT MESSAGE (OUTPATIENT)
Dept: ADMINISTRATIVE | Facility: HOSPITAL | Age: 81
End: 2021-01-04

## 2021-04-05 ENCOUNTER — PATIENT MESSAGE (OUTPATIENT)
Dept: ADMINISTRATIVE | Facility: HOSPITAL | Age: 81
End: 2021-04-05

## 2021-07-06 ENCOUNTER — PATIENT MESSAGE (OUTPATIENT)
Dept: ADMINISTRATIVE | Facility: HOSPITAL | Age: 81
End: 2021-07-06

## 2021-10-04 ENCOUNTER — PATIENT MESSAGE (OUTPATIENT)
Dept: FAMILY MEDICINE | Facility: CLINIC | Age: 81
End: 2021-10-04

## 2022-01-10 ENCOUNTER — PATIENT MESSAGE (OUTPATIENT)
Dept: ADMINISTRATIVE | Facility: HOSPITAL | Age: 82
End: 2022-01-10
Payer: MEDICARE

## 2022-01-12 NOTE — PROGRESS NOTES
Subjective:       Patient ID: Nallely Doty is a 77 y.o. female.    Chief Complaint: Edema (redness and swelling of left leg)    Edema   This is a new problem. The current episode started yesterday. The problem occurs constantly (left upper leg). The problem has been unchanged. Pertinent negatives include no abdominal pain, anorexia, arthralgias, change in bowel habit, chest pain, chills, congestion, coughing, diaphoresis, fatigue, fever, headaches, joint swelling, myalgias, nausea, neck pain, numbness, rash, sore throat, swollen glands, urinary symptoms, vertigo, visual change, vomiting or weakness. Nothing aggravates the symptoms. She has tried nothing for the symptoms.     Review of Systems   Constitutional: Negative for chills, diaphoresis, fatigue and fever.   HENT: Negative for congestion and sore throat.    Eyes: Negative for photophobia and visual disturbance.   Respiratory: Negative for cough.    Cardiovascular: Negative for chest pain.   Gastrointestinal: Negative for abdominal pain, anorexia, change in bowel habit, nausea and vomiting.   Musculoskeletal: Negative for arthralgias, joint swelling, myalgias and neck pain.   Skin: Positive for color change. Negative for pallor, rash and wound.        Redness to left upper leg    Neurological: Negative for dizziness, vertigo, weakness, numbness and headaches.       Objective:      Physical Exam   Constitutional: She is oriented to person, place, and time. She appears well-developed and well-nourished. No distress.   Cardiovascular: Normal rate, regular rhythm and normal heart sounds.    Pulmonary/Chest: Effort normal and breath sounds normal.   Musculoskeletal: She exhibits edema and tenderness. She exhibits no deformity.        Legs:  Neurological: She is alert and oriented to person, place, and time.   Skin: Skin is warm and dry. No rash noted. She is not diaphoretic. There is erythema. No pallor.   Psychiatric: She has a normal mood and affect. Her  behavior is normal. Judgment and thought content normal.   Vitals reviewed.      Assessment:       1. Cellulitis of left lower extremity        Plan:       Cellulitis of left lower extremity  -     sulfamethoxazole-trimethoprim 800-160mg (BACTRIM DS) 800-160 mg Tab; Take 1 tablet by mouth 2 (two) times daily.  Dispense: 14 tablet; Refill: 0    Elevate leg  RTC on Friday for recheck     No

## 2022-05-31 ENCOUNTER — PATIENT MESSAGE (OUTPATIENT)
Dept: ADMINISTRATIVE | Facility: HOSPITAL | Age: 82
End: 2022-05-31
Payer: MEDICARE